# Patient Record
Sex: FEMALE | Race: WHITE | NOT HISPANIC OR LATINO | URBAN - METROPOLITAN AREA
[De-identification: names, ages, dates, MRNs, and addresses within clinical notes are randomized per-mention and may not be internally consistent; named-entity substitution may affect disease eponyms.]

---

## 2017-03-06 ENCOUNTER — APPOINTMENT (RX ONLY)
Dept: URBAN - METROPOLITAN AREA CLINIC 349 | Facility: CLINIC | Age: 77
Setting detail: DERMATOLOGY
End: 2017-03-06

## 2017-03-06 DIAGNOSIS — D485 NEOPLASM OF UNCERTAIN BEHAVIOR OF SKIN: ICD-10-CM | Status: STABLE

## 2017-03-06 DIAGNOSIS — Z85.828 PERSONAL HISTORY OF OTHER MALIGNANT NEOPLASM OF SKIN: ICD-10-CM

## 2017-03-06 DIAGNOSIS — L82.0 INFLAMED SEBORRHEIC KERATOSIS: ICD-10-CM

## 2017-03-06 DIAGNOSIS — D22 MELANOCYTIC NEVI: ICD-10-CM | Status: STABLE

## 2017-03-06 DIAGNOSIS — L82.1 OTHER SEBORRHEIC KERATOSIS: ICD-10-CM

## 2017-03-06 PROBLEM — K21.9 GASTRO-ESOPHAGEAL REFLUX DISEASE WITHOUT ESOPHAGITIS: Status: ACTIVE | Noted: 2017-03-06

## 2017-03-06 PROBLEM — D22.71 MELANOCYTIC NEVI OF RIGHT LOWER LIMB, INCLUDING HIP: Status: ACTIVE | Noted: 2017-03-06

## 2017-03-06 PROBLEM — D22.61 MELANOCYTIC NEVI OF RIGHT UPPER LIMB, INCLUDING SHOULDER: Status: ACTIVE | Noted: 2017-03-06

## 2017-03-06 PROBLEM — D22.5 MELANOCYTIC NEVI OF TRUNK: Status: ACTIVE | Noted: 2017-03-06

## 2017-03-06 PROBLEM — D22.62 MELANOCYTIC NEVI OF LEFT UPPER LIMB, INCLUDING SHOULDER: Status: ACTIVE | Noted: 2017-03-06

## 2017-03-06 PROBLEM — D04.5 CARCINOMA IN SITU OF SKIN OF TRUNK: Status: ACTIVE | Noted: 2017-03-06

## 2017-03-06 PROBLEM — D22.72 MELANOCYTIC NEVI OF LEFT LOWER LIMB, INCLUDING HIP: Status: ACTIVE | Noted: 2017-03-06

## 2017-03-06 PROBLEM — D48.5 NEOPLASM OF UNCERTAIN BEHAVIOR OF SKIN: Status: ACTIVE | Noted: 2017-03-06

## 2017-03-06 PROCEDURE — ? COUNSELING

## 2017-03-06 PROCEDURE — 11100: CPT | Mod: 59

## 2017-03-06 PROCEDURE — ? MEDICAL PHOTOGRAPHY REVIEW

## 2017-03-06 PROCEDURE — ? PATHOLOGY BILLING

## 2017-03-06 PROCEDURE — ? BIOPSY BY SHAVE METHOD

## 2017-03-06 PROCEDURE — A4550 SURGICAL TRAYS: HCPCS

## 2017-03-06 PROCEDURE — 99214 OFFICE O/P EST MOD 30 MIN: CPT | Mod: 25

## 2017-03-06 PROCEDURE — 17110 DESTRUCTION B9 LES UP TO 14: CPT

## 2017-03-06 PROCEDURE — ? LIQUID NITROGEN

## 2017-03-06 PROCEDURE — 88305 TISSUE EXAM BY PATHOLOGIST: CPT

## 2017-03-06 ASSESSMENT — LOCATION DETAILED DESCRIPTION DERM
LOCATION DETAILED: LEFT ANTERIOR LATERAL PROXIMAL UPPER ARM
LOCATION DETAILED: RIGHT POSTERIOR SHOULDER
LOCATION DETAILED: LEFT DISTAL LATERAL POSTERIOR THIGH
LOCATION DETAILED: RIGHT PROXIMAL POSTERIOR THIGH
LOCATION DETAILED: RIGHT INFERIOR MEDIAL UPPER BACK
LOCATION DETAILED: MIDDLE STERNUM
LOCATION DETAILED: LEFT DISTAL DORSAL FOREARM
LOCATION DETAILED: RIGHT MID-UPPER BACK
LOCATION DETAILED: LEFT PROXIMAL RADIAL DORSAL FOREARM
LOCATION DETAILED: RIGHT SUPERIOR MEDIAL LOWER BACK
LOCATION DETAILED: LEFT POSTERIOR SHOULDER

## 2017-03-06 ASSESSMENT — LOCATION SIMPLE DESCRIPTION DERM
LOCATION SIMPLE: LEFT SHOULDER
LOCATION SIMPLE: RIGHT UPPER BACK
LOCATION SIMPLE: LEFT UPPER ARM
LOCATION SIMPLE: CHEST
LOCATION SIMPLE: LEFT POSTERIOR THIGH
LOCATION SIMPLE: RIGHT SHOULDER
LOCATION SIMPLE: RIGHT LOWER BACK
LOCATION SIMPLE: RIGHT POSTERIOR THIGH
LOCATION SIMPLE: LEFT FOREARM

## 2017-03-06 ASSESSMENT — LOCATION ZONE DERM
LOCATION ZONE: LEG
LOCATION ZONE: ARM
LOCATION ZONE: TRUNK

## 2017-03-06 NOTE — PROCEDURE: BIOPSY BY SHAVE METHOD
Size Of Lesion In Cm: 0
Dressing: bandage
Electrodesiccation And Curettage Text: The wound bed was treated with electrodesiccation and curettage after the biopsy was performed.
Biopsy Type: H and E
Bill For Surgical Tray: yes
Detail Level: Detailed
Accession #: [id number]- Physician reading
Biopsy Method: Dermablade
Silver Nitrate Text: The wound bed was treated with silver nitrate after the biopsy was performed.
Notification Instructions: Patient will be notified of biopsy results. However, patient instructed to call the office if not contacted within 2 weeks. After the procedure, the patient was oriented to person, place, and time. Patient denied feeling dizzy, queasy, and and declined further observation after initial 5 minute observation time.
Bill 79304 For Specimen Handling/Conveyance To Laboratory?: no
Wound Care: Vaseline
Hemostasis: Aluminum Chloride
Billing Type: Third-Party Bill
Anesthesia Type: 1% lidocaine with epinephrine
Consent: Written consent was obtained and risks were reviewed including but not limited to scarring, infection, bleeding, scabbing, incomplete removal, nerve damage and allergy to anesthesia.
Electrodesiccation Text: The wound bed was treated with electrodesiccation after the biopsy was performed.
Post-Care Instructions: I reviewed with the patient in detail post-care instructions. Patient is to keep the biopsy site dry overnight, and then apply Vaseline  daily until healed. Patient may apply hydrogen peroxide soaks to remove any crusting. After the procedure, the patient was oriented to person, place, and time. Patient denied feeling dizzy, queasy, and and declined further observation after initial 5 minute observation time.
Type Of Destruction Used: Curettage
Anesthesia Volume In Cc (Will Not Render If 0): 0.5
Cryotherapy Text: The wound bed was treated with cryotherapy after the biopsy was performed.
Curettage Text: The wound bed was treated with curettage after the biopsy was performed.

## 2017-03-06 NOTE — PROCEDURE: MEDICAL PHOTOGRAPHY REVIEW
Number Of Photographs Reviewed: 6
Detail Level: Detailed
Review Findings: no new or changing lesions

## 2017-03-06 NOTE — PROCEDURE: LIQUID NITROGEN
Detail Level: Detailed
Post-Care Instructions: I reviewed with the patient in detail post-care instructions. Patient is to wear sunprotection, and avoid picking at any of the treated lesions. Pt may apply Vaseline to crusted or scabbing areas.
Include Z78.9 (Other Specified Conditions Influencing Health Status) As An Associated Diagnosis?: Yes
Medical Necessity Clause: This procedure was medically necessary because the lesions that were treated were:
Medical Necessity Information: It is in your best interest to select a reason for this procedure from the list below. All of these items fulfill various CMS LCD requirements except the new and changing color options.
Consent: The patient's consent was obtained including but not limited to risks of crusting, scabbing, blistering, scarring, darker or lighter pigmentary change, recurrence, incomplete removal and infection.

## 2017-04-06 ENCOUNTER — APPOINTMENT (RX ONLY)
Dept: URBAN - METROPOLITAN AREA CLINIC 349 | Facility: CLINIC | Age: 77
Setting detail: DERMATOLOGY
End: 2017-04-06

## 2017-04-06 PROBLEM — D04.5 CARCINOMA IN SITU OF SKIN OF TRUNK: Status: ACTIVE | Noted: 2017-04-06

## 2017-04-06 PROCEDURE — ? CURETTAGE AND DESTRUCTION

## 2017-04-06 PROCEDURE — 17263 DSTRJ MAL LES T/A/L 2.1-3.0: CPT

## 2017-04-06 PROCEDURE — A4550 SURGICAL TRAYS: HCPCS

## 2017-04-06 PROCEDURE — ? COUNSELING

## 2017-04-06 NOTE — PROCEDURE: CURETTAGE AND DESTRUCTION
Bill As A Line Item Or As Units: Line Item
Render Post-Care Instructions In Note?: no
Anesthesia Volume In Cc: 0
Additional Information: (Optional): The wound was cleaned, and a pressure dressing was applied.  The patient received detailed post-op instructions.
Number Of Curettages: 3
What Was Performed First?: Curettage
Cautery Type: electrodesiccation
Anesthesia Type: 1% lidocaine with epinephrine
Post-Care Instructions: I reviewed with the patient in detail post-care instructions. Patient is to keep the area dry for 48 hours, and not to engage in any swimming until the area is healed. Should the patient develop any fevers, chills, bleeding, severe pain patient will contact the office immediately.
Bill For Surgical Tray: yes
Size Of Lesion In Cm: 2.5
Detail Level: Detailed
Consent was obtained from the patient. The risks, benefits and alternatives to therapy were discussed in detail. Specifically, the risks of infection, scarring, bleeding, prolonged wound healing, nerve injury, incomplete removal, allergy to anesthesia and recurrence were addressed. Alternatives to ED&C, such as: surgical removal and XRT were also discussed.  Prior to the procedure, the treatment site was clearly identified and confirmed by the patient. All components of Universal Protocol/PAUSE Rule completed.

## 2017-08-22 ENCOUNTER — APPOINTMENT (RX ONLY)
Dept: URBAN - METROPOLITAN AREA CLINIC 349 | Facility: CLINIC | Age: 77
Setting detail: DERMATOLOGY
End: 2017-08-22

## 2017-08-22 DIAGNOSIS — L30.9 DERMATITIS, UNSPECIFIED: ICD-10-CM

## 2017-08-22 DIAGNOSIS — L57.0 ACTINIC KERATOSIS: ICD-10-CM

## 2017-08-22 DIAGNOSIS — Z85.828 PERSONAL HISTORY OF OTHER MALIGNANT NEOPLASM OF SKIN: ICD-10-CM

## 2017-08-22 DIAGNOSIS — D485 NEOPLASM OF UNCERTAIN BEHAVIOR OF SKIN: ICD-10-CM

## 2017-08-22 DIAGNOSIS — D22 MELANOCYTIC NEVI: ICD-10-CM | Status: STABLE

## 2017-08-22 PROBLEM — D22.72 MELANOCYTIC NEVI OF LEFT LOWER LIMB, INCLUDING HIP: Status: ACTIVE | Noted: 2017-08-22

## 2017-08-22 PROBLEM — D22.71 MELANOCYTIC NEVI OF RIGHT LOWER LIMB, INCLUDING HIP: Status: ACTIVE | Noted: 2017-08-22

## 2017-08-22 PROBLEM — D48.5 NEOPLASM OF UNCERTAIN BEHAVIOR OF SKIN: Status: ACTIVE | Noted: 2017-08-22

## 2017-08-22 PROBLEM — D22.61 MELANOCYTIC NEVI OF RIGHT UPPER LIMB, INCLUDING SHOULDER: Status: ACTIVE | Noted: 2017-08-22

## 2017-08-22 PROBLEM — D22.5 MELANOCYTIC NEVI OF TRUNK: Status: ACTIVE | Noted: 2017-08-22

## 2017-08-22 PROBLEM — D22.62 MELANOCYTIC NEVI OF LEFT UPPER LIMB, INCLUDING SHOULDER: Status: ACTIVE | Noted: 2017-08-22

## 2017-08-22 PROBLEM — K21.9 GASTRO-ESOPHAGEAL REFLUX DISEASE WITHOUT ESOPHAGITIS: Status: ACTIVE | Noted: 2017-08-22

## 2017-08-22 PROCEDURE — ? LIQUID NITROGEN

## 2017-08-22 PROCEDURE — ? OBSERVATION

## 2017-08-22 PROCEDURE — ? MEDICAL PHOTOGRAPHY REVIEW

## 2017-08-22 PROCEDURE — 99214 OFFICE O/P EST MOD 30 MIN: CPT | Mod: 25

## 2017-08-22 PROCEDURE — 17000 DESTRUCT PREMALG LESION: CPT

## 2017-08-22 PROCEDURE — ? OTHER

## 2017-08-22 PROCEDURE — ? COUNSELING

## 2017-08-22 ASSESSMENT — LOCATION DETAILED DESCRIPTION DERM
LOCATION DETAILED: RIGHT PROXIMAL DORSAL FOREARM
LOCATION DETAILED: RIGHT SUPERIOR MEDIAL LOWER BACK
LOCATION DETAILED: LEFT DISTAL DORSAL FOREARM
LOCATION DETAILED: RIGHT POSTERIOR SHOULDER
LOCATION DETAILED: RIGHT SUPERIOR UPPER BACK
LOCATION DETAILED: LEFT RADIAL DORSAL HAND
LOCATION DETAILED: RIGHT INFERIOR MEDIAL UPPER BACK
LOCATION DETAILED: LEFT DISTAL CALF
LOCATION DETAILED: RIGHT PROXIMAL POSTERIOR THIGH
LOCATION DETAILED: LEFT DISTAL LATERAL POSTERIOR THIGH
LOCATION DETAILED: LEFT PROXIMAL DORSAL FOREARM
LOCATION DETAILED: LEFT POSTERIOR SHOULDER

## 2017-08-22 ASSESSMENT — LOCATION SIMPLE DESCRIPTION DERM
LOCATION SIMPLE: LEFT CALF
LOCATION SIMPLE: LEFT SHOULDER
LOCATION SIMPLE: RIGHT FOREARM
LOCATION SIMPLE: LEFT POSTERIOR THIGH
LOCATION SIMPLE: RIGHT LOWER BACK
LOCATION SIMPLE: RIGHT POSTERIOR THIGH
LOCATION SIMPLE: LEFT HAND
LOCATION SIMPLE: LEFT FOREARM
LOCATION SIMPLE: RIGHT SHOULDER
LOCATION SIMPLE: RIGHT UPPER BACK

## 2017-08-22 ASSESSMENT — LOCATION ZONE DERM
LOCATION ZONE: ARM
LOCATION ZONE: HAND
LOCATION ZONE: TRUNK
LOCATION ZONE: LEG

## 2017-08-22 ASSESSMENT — PAIN INTENSITY VAS: HOW INTENSE IS YOUR PAIN 0 BEING NO PAIN, 10 BEING THE MOST SEVERE PAIN POSSIBLE?: NO PAIN

## 2017-08-22 NOTE — PROCEDURE: OTHER
Other (Free Text): Pt thinks irritation is due to chemo\\nWill recheck in 2-3mo\\nSEE PHOTO
Note Text (......Xxx Chief Complaint.): This diagnosis correlates with the
Detail Level: Simple

## 2017-08-22 NOTE — PROCEDURE: LIQUID NITROGEN
Consent: The patient's consent was obtained including but not limited to risks of crusting, scabbing, blistering, scarring, darker or lighter pigmentary change, recurrence, incomplete removal and infection.
Number Of Freeze-Thaw Cycles: 2 freeze-thaw cycles
Duration Of Freeze Thaw-Cycle (Seconds): 3
Post-Care Instructions: I reviewed with the patient in detail post-care instructions. Patient is to wear sunprotection, and avoid picking at any of the treated lesions. Pt may apply Vaseline to crusted or scabbing areas.
Render Post-Care Instructions In Note?: no
Detail Level: Detailed

## 2017-08-22 NOTE — PROCEDURE: MEDICAL PHOTOGRAPHY REVIEW
Review Findings: no new or changing lesions
Number Of Photographs Reviewed: 6
Detail Level: Detailed

## 2017-12-11 ENCOUNTER — APPOINTMENT (RX ONLY)
Dept: URBAN - METROPOLITAN AREA CLINIC 349 | Facility: CLINIC | Age: 77
Setting detail: DERMATOLOGY
End: 2017-12-11

## 2017-12-11 DIAGNOSIS — L30.9 DERMATITIS, UNSPECIFIED: ICD-10-CM

## 2017-12-11 DIAGNOSIS — D485 NEOPLASM OF UNCERTAIN BEHAVIOR OF SKIN: ICD-10-CM | Status: RESOLVED

## 2017-12-11 DIAGNOSIS — L57.0 ACTINIC KERATOSIS: ICD-10-CM

## 2017-12-11 PROBLEM — D48.5 NEOPLASM OF UNCERTAIN BEHAVIOR OF SKIN: Status: ACTIVE | Noted: 2017-12-11

## 2017-12-11 PROCEDURE — ? BIOPSY BY SHAVE METHOD

## 2017-12-11 PROCEDURE — 11100: CPT

## 2017-12-11 PROCEDURE — A4550 SURGICAL TRAYS: HCPCS

## 2017-12-11 PROCEDURE — ? COUNSELING

## 2017-12-11 PROCEDURE — ? TREATMENT REGIMEN

## 2017-12-11 PROCEDURE — ? PRESCRIPTION

## 2017-12-11 PROCEDURE — 99213 OFFICE O/P EST LOW 20 MIN: CPT | Mod: 25

## 2017-12-11 RX ORDER — TRIAMCINOLONE ACETONIDE 1 MG/G
CREAM TOPICAL BID
Qty: 1 | Refills: 3 | Status: ERX | COMMUNITY
Start: 2017-12-11

## 2017-12-11 RX ADMIN — TRIAMCINOLONE ACETONIDE: 1 CREAM TOPICAL at 20:22

## 2017-12-11 ASSESSMENT — LOCATION SIMPLE DESCRIPTION DERM
LOCATION SIMPLE: LEFT FOREARM
LOCATION SIMPLE: LEFT PRETIBIAL REGION

## 2017-12-11 ASSESSMENT — LOCATION ZONE DERM
LOCATION ZONE: ARM
LOCATION ZONE: LEG

## 2017-12-11 ASSESSMENT — LOCATION DETAILED DESCRIPTION DERM
LOCATION DETAILED: LEFT DISTAL PRETIBIAL REGION
LOCATION DETAILED: LEFT PROXIMAL RADIAL DORSAL FOREARM

## 2017-12-11 NOTE — PROCEDURE: BIOPSY BY SHAVE METHOD
Hemostasis: Aluminum Chloride
Billing Type: Third-Party Bill
Curettage Text: The wound bed was treated with curettage after the biopsy was performed.
Anesthesia Type: 2% lidocaine with epinephrine and a 1:10 solution of 8.4% sodium bicarbonate
Post-Care Instructions: I reviewed with the patient in detail post-care instructions. Patient is to keep the biopsy site dry overnight, and then apply Vaseline  daily until healed. Patient may apply hydrogen peroxide soaks to remove any crusting. After the procedure, the patient was oriented to person, place, and time. Patient denied feeling dizzy, queasy, and and declined further observation after initial 5 minute observation time.
Anesthesia Volume In Cc (Will Not Render If 0): 0.5
Cryotherapy Text: The wound bed was treated with cryotherapy after the biopsy was performed.
Render Post-Care Instructions In Note?: yes
Consent: Written consent was obtained and risks were reviewed including but not limited to scarring, infection, bleeding, scabbing, incomplete removal, nerve damage and allergy to anesthesia.
Type Of Destruction Used: Curettage
Additional Anesthesia Volume In Cc (Will Not Render If 0): 1.5
X Size Of Lesion In Cm: 0
Silver Nitrate Text: The wound bed was treated with silver nitrate after the biopsy was performed.
Destruction After The Procedure: No
Biopsy Method: Dermablade
Accession #: global
Biopsy Type: H and E
Electrodesiccation Text: The wound bed was treated with electrodesiccation after the biopsy was performed.
Electrodesiccation And Curettage Text: The wound bed was treated with electrodesiccation and curettage after the biopsy was performed.
Detail Level: Detailed
Wound Care: Vaseline
Dressing: bandage
Notification Instructions: Patient will be notified of biopsy results. However, patient instructed to call the office if not contacted within 2 weeks. After the procedure, the patient was oriented to person, place, and time. Patient denied feeling dizzy, queasy, and and declined further observation after initial 5 minute observation time.

## 2018-01-24 PROBLEM — G89.4 CHRONIC PAIN SYNDROME: Status: ACTIVE | Noted: 2018-01-24

## 2018-06-26 ENCOUNTER — APPOINTMENT (RX ONLY)
Dept: URBAN - METROPOLITAN AREA CLINIC 349 | Facility: CLINIC | Age: 78
Setting detail: DERMATOLOGY
End: 2018-06-26

## 2018-06-26 DIAGNOSIS — L82.0 INFLAMED SEBORRHEIC KERATOSIS: ICD-10-CM

## 2018-06-26 DIAGNOSIS — D22 MELANOCYTIC NEVI: ICD-10-CM | Status: STABLE

## 2018-06-26 DIAGNOSIS — Z85.828 PERSONAL HISTORY OF OTHER MALIGNANT NEOPLASM OF SKIN: ICD-10-CM

## 2018-06-26 DIAGNOSIS — D485 NEOPLASM OF UNCERTAIN BEHAVIOR OF SKIN: ICD-10-CM

## 2018-06-26 PROBLEM — D22.5 MELANOCYTIC NEVI OF TRUNK: Status: ACTIVE | Noted: 2018-06-26

## 2018-06-26 PROBLEM — D22.61 MELANOCYTIC NEVI OF RIGHT UPPER LIMB, INCLUDING SHOULDER: Status: ACTIVE | Noted: 2018-06-26

## 2018-06-26 PROBLEM — D22.62 MELANOCYTIC NEVI OF LEFT UPPER LIMB, INCLUDING SHOULDER: Status: ACTIVE | Noted: 2018-06-26

## 2018-06-26 PROBLEM — D48.5 NEOPLASM OF UNCERTAIN BEHAVIOR OF SKIN: Status: ACTIVE | Noted: 2018-06-26

## 2018-06-26 PROBLEM — D04.62 CARCINOMA IN SITU OF SKIN OF LEFT UPPER LIMB, INCLUDING SHOULDER: Status: ACTIVE | Noted: 2018-06-26

## 2018-06-26 PROBLEM — M12.9 ARTHROPATHY, UNSPECIFIED: Status: ACTIVE | Noted: 2018-06-26

## 2018-06-26 PROBLEM — D22.72 MELANOCYTIC NEVI OF LEFT LOWER LIMB, INCLUDING HIP: Status: ACTIVE | Noted: 2018-06-26

## 2018-06-26 PROBLEM — D22.71 MELANOCYTIC NEVI OF RIGHT LOWER LIMB, INCLUDING HIP: Status: ACTIVE | Noted: 2018-06-26

## 2018-06-26 PROCEDURE — ? COUNSELING

## 2018-06-26 PROCEDURE — 99214 OFFICE O/P EST MOD 30 MIN: CPT | Mod: 25

## 2018-06-26 PROCEDURE — 11100: CPT

## 2018-06-26 PROCEDURE — 88305 TISSUE EXAM BY PATHOLOGIST: CPT

## 2018-06-26 PROCEDURE — ? MEDICAL PHOTOGRAPHY REVIEW

## 2018-06-26 PROCEDURE — ? BIOPSY BY SHAVE METHOD

## 2018-06-26 PROCEDURE — A4550 SURGICAL TRAYS: HCPCS

## 2018-06-26 PROCEDURE — ? PATHOLOGY BILLING

## 2018-06-26 PROCEDURE — ? OBSERVATION

## 2018-06-26 ASSESSMENT — LOCATION SIMPLE DESCRIPTION DERM
LOCATION SIMPLE: RIGHT POSTERIOR THIGH
LOCATION SIMPLE: LEFT SHOULDER
LOCATION SIMPLE: RIGHT UPPER BACK
LOCATION SIMPLE: LEFT POSTERIOR THIGH
LOCATION SIMPLE: RIGHT SHOULDER
LOCATION SIMPLE: LEFT PRETIBIAL REGION
LOCATION SIMPLE: LEFT FOREARM
LOCATION SIMPLE: RIGHT LOWER BACK

## 2018-06-26 ASSESSMENT — LOCATION ZONE DERM
LOCATION ZONE: LEG
LOCATION ZONE: TRUNK
LOCATION ZONE: ARM

## 2018-06-26 ASSESSMENT — LOCATION DETAILED DESCRIPTION DERM
LOCATION DETAILED: RIGHT PROXIMAL POSTERIOR THIGH
LOCATION DETAILED: RIGHT INFERIOR MEDIAL UPPER BACK
LOCATION DETAILED: RIGHT POSTERIOR SHOULDER
LOCATION DETAILED: RIGHT SUPERIOR MEDIAL LOWER BACK
LOCATION DETAILED: LEFT ANTERIOR SHOULDER
LOCATION DETAILED: LEFT DISTAL PRETIBIAL REGION
LOCATION DETAILED: LEFT POSTERIOR SHOULDER
LOCATION DETAILED: LEFT DISTAL LATERAL POSTERIOR THIGH
LOCATION DETAILED: LEFT DISTAL DORSAL FOREARM
LOCATION DETAILED: RIGHT SUPERIOR UPPER BACK

## 2018-06-26 ASSESSMENT — PAIN INTENSITY VAS: HOW INTENSE IS YOUR PAIN 0 BEING NO PAIN, 10 BEING THE MOST SEVERE PAIN POSSIBLE?: NO PAIN

## 2018-06-26 NOTE — PROCEDURE: PATHOLOGY BILLING
Immunohistochemistry ([id number] and [id number]) billing is not performed here. Please use the Immunohistochemistry Stain Billing plan to accomplish this. Immunohistochemistry ([id number] and [id number]) billing is not performed here. Please use the Immunohistochemistry Stain Billing plan to accomplish this.

## 2018-06-26 NOTE — PROCEDURE: BIOPSY BY SHAVE METHOD
Billing Type: Third-Party Bill
Depth Of Biopsy: dermis
Detail Level: Detailed
Destruction After The Procedure: No
Electrodesiccation Text: The wound bed was treated with electrodesiccation after the biopsy was performed.
Render Post-Care Instructions In Note?: yes
Curettage Text: The wound bed was treated with curettage after the biopsy was performed.
Wound Care: Vaseline
Consent: Written consent was obtained and risks were reviewed including but not limited to scarring, infection, bleeding, scabbing, incomplete removal, nerve damage and allergy to anesthesia.
Hemostasis: Aluminum Chloride
Size Of Lesion In Cm: 0
Cryotherapy Text: The wound bed was treated with cryotherapy after the biopsy was performed.
Anesthesia Type: 1% lidocaine with 1:100,000 epinephrine and a 1:10 solution of 8.4% sodium bicarbonate
Biopsy Type: H and E
Anesthesia Volume In Cc (Will Not Render If 0): 1
Notification Instructions: Patient will be notified of biopsy results. However, patient instructed to call the office if not contacted within 2 weeks. After the procedure, the patient was oriented to person, place, and time. Patient denied feeling dizzy, queasy, and and declined further observation after initial 5 minute observation time.
Silver Nitrate Text: The wound bed was treated with silver nitrate after the biopsy was performed.
Accession #: [id number] ONLY
Type Of Destruction Used: Curettage
Biopsy Method: Personna blade
Post-Care Instructions: I reviewed with the patient in detail post-care instructions. Patient is to keep the biopsy site dry overnight, and then apply Vaseline  daily until healed. Patient may apply hydrogen peroxide soaks to remove any crusting. After the procedure, the patient was oriented to person, place, and time. Patient denied feeling dizzy, queasy, and and declined further observation after initial 5 minute observation time.
Dressing: bandage
Electrodesiccation And Curettage Text: The wound bed was treated with electrodesiccation and curettage after the biopsy was performed.
Additional Anesthesia Volume In Cc (Will Not Render If 0): 1.5

## 2018-08-15 ENCOUNTER — APPOINTMENT (RX ONLY)
Dept: URBAN - METROPOLITAN AREA CLINIC 349 | Facility: CLINIC | Age: 78
Setting detail: DERMATOLOGY
End: 2018-08-15

## 2018-08-15 DIAGNOSIS — D485 NEOPLASM OF UNCERTAIN BEHAVIOR OF SKIN: ICD-10-CM

## 2018-08-15 PROBLEM — D48.5 NEOPLASM OF UNCERTAIN BEHAVIOR OF SKIN: Status: ACTIVE | Noted: 2018-08-15

## 2018-08-15 PROBLEM — D04.62 CARCINOMA IN SITU OF SKIN OF LEFT UPPER LIMB, INCLUDING SHOULDER: Status: ACTIVE | Noted: 2018-08-15

## 2018-08-15 PROCEDURE — A4550 SURGICAL TRAYS: HCPCS

## 2018-08-15 PROCEDURE — ? OBSERVATION

## 2018-08-15 PROCEDURE — ? COUNSELING

## 2018-08-15 PROCEDURE — ? CURETTAGE AND DESTRUCTION

## 2018-08-15 PROCEDURE — 17262 DSTRJ MAL LES T/A/L 1.1-2.0: CPT

## 2018-08-15 PROCEDURE — 99213 OFFICE O/P EST LOW 20 MIN: CPT | Mod: 25

## 2018-08-15 ASSESSMENT — LOCATION SIMPLE DESCRIPTION DERM: LOCATION SIMPLE: RIGHT FOREARM

## 2018-08-15 ASSESSMENT — LOCATION ZONE DERM: LOCATION ZONE: ARM

## 2018-08-15 ASSESSMENT — LOCATION DETAILED DESCRIPTION DERM: LOCATION DETAILED: RIGHT PROXIMAL DORSAL FOREARM

## 2018-08-15 NOTE — PROCEDURE: CURETTAGE AND DESTRUCTION
Consent was obtained from the patient. The risks, benefits and alternatives to therapy were discussed in detail. Specifically, the risks of infection, scarring, bleeding, prolonged wound healing, nerve injury, incomplete removal, allergy to anesthesia and recurrence were addressed. Alternatives to ED&C, such as: surgical removal and XRT were also discussed.  Prior to the procedure, the treatment site was clearly identified and confirmed by the patient. All components of Universal Protocol/PAUSE Rule completed.
What Was Performed First?: Curettage
Number Of Curettages: 3
Size Of Lesion After Curettage: 1.2
Post-Care Instructions: I reviewed with the patient in detail post-care instructions. Patient is to keep the area dry for 48 hours, and not to engage in any swimming until the area is healed. Should the patient develop any fevers, chills, bleeding, severe pain patient will contact the office immediately.
Bill As A Line Item Or As Units: Line Item
Anesthesia Volume In Cc: 1.5
Cautery Type: electrodesiccation
Render Post-Care Instructions In Note?: no
Bill For Surgical Tray: yes
Anesthesia Type: 1% lidocaine with epinephrine
Total Volume (Ccs): 1
Detail Level: Detailed
Additional Information: (Optional): The wound was cleaned, and a pressure dressing was applied.  The patient received detailed post-op instructions.

## 2018-10-04 ENCOUNTER — APPOINTMENT (RX ONLY)
Dept: URBAN - METROPOLITAN AREA CLINIC 349 | Facility: CLINIC | Age: 78
Setting detail: DERMATOLOGY
End: 2018-10-04

## 2018-10-04 DIAGNOSIS — L90.5 SCAR CONDITIONS AND FIBROSIS OF SKIN: ICD-10-CM

## 2018-10-04 DIAGNOSIS — L82.0 INFLAMED SEBORRHEIC KERATOSIS: ICD-10-CM

## 2018-10-04 PROBLEM — E03.9 HYPOTHYROIDISM, UNSPECIFIED: Status: ACTIVE | Noted: 2018-10-04

## 2018-10-04 PROBLEM — F41.9 ANXIETY DISORDER, UNSPECIFIED: Status: ACTIVE | Noted: 2018-10-04

## 2018-10-04 PROBLEM — D04.61 CARCINOMA IN SITU OF SKIN OF RIGHT UPPER LIMB, INCLUDING SHOULDER: Status: ACTIVE | Noted: 2018-10-04

## 2018-10-04 PROCEDURE — 11100: CPT

## 2018-10-04 PROCEDURE — A4550 SURGICAL TRAYS: HCPCS

## 2018-10-04 PROCEDURE — 99213 OFFICE O/P EST LOW 20 MIN: CPT | Mod: 25

## 2018-10-04 PROCEDURE — ? BIOPSY BY SHAVE METHOD

## 2018-10-04 PROCEDURE — 88305 TISSUE EXAM BY PATHOLOGIST: CPT

## 2018-10-04 PROCEDURE — ? COUNSELING

## 2018-10-04 PROCEDURE — ? PATHOLOGY BILLING

## 2018-10-04 ASSESSMENT — LOCATION ZONE DERM
LOCATION ZONE: LEG
LOCATION ZONE: TRUNK

## 2018-10-04 ASSESSMENT — LOCATION SIMPLE DESCRIPTION DERM
LOCATION SIMPLE: UPPER BACK
LOCATION SIMPLE: LEFT PRETIBIAL REGION

## 2018-10-04 ASSESSMENT — LOCATION DETAILED DESCRIPTION DERM
LOCATION DETAILED: INFERIOR THORACIC SPINE
LOCATION DETAILED: LEFT DISTAL PRETIBIAL REGION

## 2018-10-04 NOTE — PROCEDURE: PATHOLOGY BILLING
Immunohistochemistry ([id number] and [id number]) billing is not performed here. Please use the Immunohistochemistry Stain Billing plan to accomplish this. Immunohistochemistry ([id number] and [id number]) billing is not performed here. Please use the Immunohistochemistry Stain Billing plan to accomplish this.

## 2018-10-04 NOTE — PROCEDURE: BIOPSY BY SHAVE METHOD
Size Of Lesion In Cm: 0
Dressing: bandage
Electrodesiccation And Curettage Text: The wound bed was treated with electrodesiccation and curettage after the biopsy was performed.
Detail Level: Detailed
Accession #: [id number]
Silver Nitrate Text: The wound bed was treated with silver nitrate after the biopsy was performed.
Hemostasis: Aluminum Chloride
Consent: Written consent was obtained and risks were reviewed including but not limited to scarring, infection, bleeding, scabbing, incomplete removal, nerve damage and allergy to anesthesia.
Anesthesia Volume In Cc (Will Not Render If 0): 1
Biopsy Type: H and E
Post-Care Instructions: I reviewed with the patient in detail post-care instructions. Patient is to keep the biopsy site dry overnight, and then apply Vaseline  daily until healed. Patient may apply hydrogen peroxide soaks to remove any crusting. After the procedure, the patient was oriented to person, place, and time. Patient denied feeling dizzy, queasy, and and declined further observation after initial 5 minute observation time.
Cryotherapy Text: The wound bed was treated with cryotherapy after the biopsy was performed.
Additional Anesthesia Volume In Cc (Will Not Render If 0): 1.5
Bill For Surgical Tray: yes
Wound Care: Vaseline
Notification Instructions: Patient will be notified of biopsy results. However, patient instructed to call the office if not contacted within 2 weeks. After the procedure, the patient was oriented to person, place, and time. Patient denied feeling dizzy, queasy, and and declined further observation after initial 5 minute observation time.
Anesthesia Type: 1% lidocaine with 1:100,000 epinephrine and a 1:10 solution of 8.4% sodium bicarbonate
Destruction After The Procedure: No
Depth Of Biopsy: dermis
Type Of Destruction Used: Curettage
Biopsy Method: Personna blade
Curettage Text: The wound bed was treated with curettage after the biopsy was performed.
Electrodesiccation Text: The wound bed was treated with electrodesiccation after the biopsy was performed.
Billing Type: Third-Party Bill

## 2018-10-25 ENCOUNTER — APPOINTMENT (RX ONLY)
Dept: URBAN - METROPOLITAN AREA CLINIC 349 | Facility: CLINIC | Age: 78
Setting detail: DERMATOLOGY
End: 2018-10-25

## 2018-10-25 DIAGNOSIS — L30.4 ERYTHEMA INTERTRIGO: ICD-10-CM

## 2018-10-25 PROBLEM — D04.61 CARCINOMA IN SITU OF SKIN OF RIGHT UPPER LIMB, INCLUDING SHOULDER: Status: ACTIVE | Noted: 2018-10-25

## 2018-10-25 PROCEDURE — ? COUNSELING

## 2018-10-25 PROCEDURE — A4550 SURGICAL TRAYS: HCPCS

## 2018-10-25 PROCEDURE — ? PRESCRIPTION

## 2018-10-25 PROCEDURE — 17263 DSTRJ MAL LES T/A/L 2.1-3.0: CPT

## 2018-10-25 PROCEDURE — 99212 OFFICE O/P EST SF 10 MIN: CPT | Mod: 25

## 2018-10-25 PROCEDURE — ? CURETTAGE AND DESTRUCTION

## 2018-10-25 RX ORDER — IODOQUINOL, HYDROCORTISONE ACETATE AND ALOE VERA LEAF 10; 20; 10 MG/G; MG/G; MG/G
GEL TOPICAL
Qty: 1 | Refills: 3 | Status: ERX | COMMUNITY
Start: 2018-10-25

## 2018-10-25 RX ADMIN — IODOQUINOL, HYDROCORTISONE ACETATE AND ALOE VERA LEAF: 10; 20; 10 GEL TOPICAL at 17:31

## 2018-10-25 ASSESSMENT — LOCATION DETAILED DESCRIPTION DERM
LOCATION DETAILED: LEFT LATERAL ABDOMEN
LOCATION DETAILED: RIGHT LATERAL ABDOMEN

## 2018-10-25 ASSESSMENT — LOCATION ZONE DERM: LOCATION ZONE: TRUNK

## 2018-10-25 ASSESSMENT — LOCATION SIMPLE DESCRIPTION DERM: LOCATION SIMPLE: ABDOMEN

## 2018-10-25 NOTE — PROCEDURE: CURETTAGE AND DESTRUCTION
Bill For Surgical Tray: yes
What Was Performed First?: Curettage
Anesthesia Volume In Cc: 3
Size Of Lesion In Cm: 2.8
Additional Information: (Optional): The wound was cleaned, and a pressure dressing was applied.  The patient received detailed post-op instructions.
Post-Care Instructions: I reviewed with the patient in detail post-care instructions. Patient is to keep the area dry for 48 hours, and not to engage in any swimming until the area is healed. Should the patient develop any fevers, chills, bleeding, severe pain patient will contact the office immediately.
Total Volume (Ccs): 1
Bill As A Line Item Or As Units: Line Item
Cautery Type: electrodesiccation
Consent was obtained from the patient. The risks, benefits and alternatives to therapy were discussed in detail. Specifically, the risks of infection, scarring, bleeding, prolonged wound healing, nerve injury, incomplete removal, allergy to anesthesia and recurrence were addressed. Alternatives to ED&C, such as: surgical removal and XRT were also discussed.  Prior to the procedure, the treatment site was clearly identified and confirmed by the patient. All components of Universal Protocol/PAUSE Rule completed.
Add Intralesional Injection: No
Detail Level: Detailed
Anesthesia Type: 1% lidocaine with epinephrine and a 1:10 solution of 8.4% sodium bicarbonate

## 2019-04-04 ENCOUNTER — APPOINTMENT (RX ONLY)
Dept: URBAN - METROPOLITAN AREA CLINIC 349 | Facility: CLINIC | Age: 79
Setting detail: DERMATOLOGY
End: 2019-04-04

## 2019-04-04 DIAGNOSIS — L30.4 ERYTHEMA INTERTRIGO: ICD-10-CM | Status: WELL CONTROLLED

## 2019-04-04 DIAGNOSIS — Z71.89 OTHER SPECIFIED COUNSELING: ICD-10-CM

## 2019-04-04 DIAGNOSIS — L30.9 DERMATITIS, UNSPECIFIED: ICD-10-CM | Status: WELL CONTROLLED

## 2019-04-04 DIAGNOSIS — D22 MELANOCYTIC NEVI: ICD-10-CM

## 2019-04-04 DIAGNOSIS — Z85.828 PERSONAL HISTORY OF OTHER MALIGNANT NEOPLASM OF SKIN: ICD-10-CM

## 2019-04-04 PROBLEM — D48.5 NEOPLASM OF UNCERTAIN BEHAVIOR OF SKIN: Status: ACTIVE | Noted: 2019-04-04

## 2019-04-04 PROBLEM — D22.5 MELANOCYTIC NEVI OF TRUNK: Status: ACTIVE | Noted: 2019-04-04

## 2019-04-04 PROBLEM — M12.9 ARTHROPATHY, UNSPECIFIED: Status: ACTIVE | Noted: 2019-04-04

## 2019-04-04 PROBLEM — D04.62 CARCINOMA IN SITU OF SKIN OF LEFT UPPER LIMB, INCLUDING SHOULDER: Status: ACTIVE | Noted: 2019-04-04

## 2019-04-04 PROBLEM — D22.72 MELANOCYTIC NEVI OF LEFT LOWER LIMB, INCLUDING HIP: Status: ACTIVE | Noted: 2019-04-04

## 2019-04-04 PROCEDURE — 88305 TISSUE EXAM BY PATHOLOGIST: CPT

## 2019-04-04 PROCEDURE — ? MEDICAL PHOTOGRAPHY REVIEW

## 2019-04-04 PROCEDURE — ? OBSERVATION

## 2019-04-04 PROCEDURE — ? BODY PHOTOGRAPHY

## 2019-04-04 PROCEDURE — ? BIOPSY BY SHAVE METHOD

## 2019-04-04 PROCEDURE — 11102 TANGNTL BX SKIN SINGLE LES: CPT

## 2019-04-04 PROCEDURE — ? PATHOLOGY BILLING

## 2019-04-04 PROCEDURE — ? TREATMENT REGIMEN

## 2019-04-04 PROCEDURE — ? COUNSELING

## 2019-04-04 PROCEDURE — ? PRESCRIPTION

## 2019-04-04 PROCEDURE — 99214 OFFICE O/P EST MOD 30 MIN: CPT | Mod: 25

## 2019-04-04 PROCEDURE — A4550 SURGICAL TRAYS: HCPCS

## 2019-04-04 PROCEDURE — ? EDUCATIONAL RESOURCES PROVIDED

## 2019-04-04 RX ORDER — IODOQUINOL, HYDROCORTISONE ACETATE AND ALOE VERA LEAF 10; 20; 10 MG/G; MG/G; MG/G
GEL TOPICAL
Qty: 1 | Refills: 3 | Status: ERX

## 2019-04-04 ASSESSMENT — LOCATION ZONE DERM
LOCATION ZONE: TRUNK
LOCATION ZONE: LEG
LOCATION ZONE: FEET
LOCATION ZONE: ARM

## 2019-04-04 ASSESSMENT — LOCATION DETAILED DESCRIPTION DERM
LOCATION DETAILED: LEFT LATERAL DORSAL FOOT
LOCATION DETAILED: LEFT PROXIMAL DORSAL FOREARM
LOCATION DETAILED: RIGHT PROXIMAL DORSAL FOREARM
LOCATION DETAILED: RIGHT INFERIOR UPPER BACK
LOCATION DETAILED: RIGHT INFERIOR MEDIAL UPPER BACK
LOCATION DETAILED: RIGHT MID-UPPER BACK
LOCATION DETAILED: RIGHT SUPERIOR MEDIAL LOWER BACK
LOCATION DETAILED: LEFT DISTAL PRETIBIAL REGION
LOCATION DETAILED: RIGHT SUPERIOR UPPER BACK
LOCATION DETAILED: RIGHT LATERAL ABDOMEN
LOCATION DETAILED: LEFT DISTAL DORSAL FOREARM
LOCATION DETAILED: LEFT LATERAL ABDOMEN

## 2019-04-04 ASSESSMENT — SEVERITY ASSESSMENT: SEVERITY: CLEAR

## 2019-04-04 ASSESSMENT — LOCATION SIMPLE DESCRIPTION DERM
LOCATION SIMPLE: ABDOMEN
LOCATION SIMPLE: RIGHT UPPER BACK
LOCATION SIMPLE: LEFT PRETIBIAL REGION
LOCATION SIMPLE: RIGHT LOWER BACK
LOCATION SIMPLE: RIGHT FOREARM
LOCATION SIMPLE: LEFT FOREARM
LOCATION SIMPLE: LEFT FOOT

## 2019-04-04 NOTE — PROCEDURE: BIOPSY BY SHAVE METHOD
Hemostasis: Aluminum Chloride
Detail Level: Detailed
Biopsy Type: H and E
Bill 44538 For Specimen Handling/Conveyance To Laboratory?: no
Electrodesiccation Text: The wound bed was treated with electrodesiccation after the biopsy was performed.
Consent: Written consent was obtained and risks were reviewed including but not limited to scarring, infection, bleeding, scabbing, incomplete removal, nerve damage and allergy to anesthesia.
Anesthesia Type: 1% lidocaine with 1:100,000 epinephrine and a 1:10 solution of 8.4% sodium bicarbonate
Wound Care: Vaseline
Post-Care Instructions: I reviewed with the patient in detail post-care instructions. Patient is to keep the biopsy site dry overnight, and then apply Vaseline  daily until healed. Patient may apply hydrogen peroxide soaks to remove any crusting. After the procedure, the patient was oriented to person, place, and time. Patient denied feeling dizzy, queasy, and and declined further observation after initial 5 minute observation time.
Curettage Text: The wound bed was treated with curettage after the biopsy was performed.
Biopsy Method: Personna blade
Billing Type: Third-Party Bill
Render Post-Care Instructions In Note?: yes
Notification Instructions: Patient will be notified of biopsy results. However, patient instructed to call the office if not contacted within 2 weeks. After the procedure, the patient was oriented to person, place, and time. Patient denied feeling dizzy, queasy, and and declined further observation after initial 5 minute observation time.
Anesthesia Volume In Cc (Will Not Render If 0): 1
Depth Of Biopsy: dermis
Cryotherapy Text: The wound bed was treated with cryotherapy after the biopsy was performed.
Type Of Destruction Used: Curettage
Silver Nitrate Text: The wound bed was treated with silver nitrate after the biopsy was performed.
Dressing: bandage
Accession #: [id number] ONLY
Size Of Lesion In Cm: 0
Additional Anesthesia Volume In Cc (Will Not Render If 0): 1.5
Electrodesiccation And Curettage Text: The wound bed was treated with electrodesiccation and curettage after the biopsy was performed.

## 2019-04-04 NOTE — PROCEDURE: TREATMENT REGIMEN
Continue Regimen: triamcinolone acetonide 0.1 % topical cream BID Sig: Apply twice daily to rash up to 2 weeks/month as needed. Patient denies needing refills at this time.
Detail Level: Detailed
Otc Regimen: Moisturize daily

## 2019-04-04 NOTE — PROCEDURE: BODY PHOTOGRAPHY
Detail Level: Generalized
Was The Entire Body Photographed (Cannot Bill Unless Entire Body Photographed)?: No
Whole Body Statement: The whole body was photographed today.
Reason For Photography: The patient is obtaining body photography to observe existing suspicious moles and or monitor for the appearance of any new lesions.
Consent: Written consent obtained, risks reviewed for whole body photography. Patient understands that photograph costs may not be covered by insurance, and patient is ultimately responsible for payment.
Number Of Photographs (Optional- Will Not Render If 0): 2

## 2019-04-04 NOTE — PROCEDURE: PATHOLOGY BILLING
Immunohistochemistry ([id number] and [id number]) billing is not performed here. Please use the Immunohistochemistry Stain Billing plan to accomplish this. Immunohistochemistry ([id number] and [id number]) billing is not performed here. Please use the Immunohistochemistry Stain Billing plan to accomplish this.

## 2019-05-07 ENCOUNTER — APPOINTMENT (RX ONLY)
Dept: URBAN - METROPOLITAN AREA CLINIC 349 | Facility: CLINIC | Age: 79
Setting detail: DERMATOLOGY
End: 2019-05-07

## 2019-05-07 DIAGNOSIS — L82.1 OTHER SEBORRHEIC KERATOSIS: ICD-10-CM

## 2019-05-07 PROBLEM — D04.62 CARCINOMA IN SITU OF SKIN OF LEFT UPPER LIMB, INCLUDING SHOULDER: Status: ACTIVE | Noted: 2019-05-07

## 2019-05-07 PROCEDURE — ? CURETTAGE AND DESTRUCTION

## 2019-05-07 PROCEDURE — ? COUNSELING

## 2019-05-07 PROCEDURE — 17263 DSTRJ MAL LES T/A/L 2.1-3.0: CPT

## 2019-05-07 PROCEDURE — ? OTHER

## 2019-05-07 PROCEDURE — 99213 OFFICE O/P EST LOW 20 MIN: CPT | Mod: 25

## 2019-05-07 PROCEDURE — A4550 SURGICAL TRAYS: HCPCS

## 2019-05-07 ASSESSMENT — LOCATION SIMPLE DESCRIPTION DERM: LOCATION SIMPLE: LEFT UPPER ARM

## 2019-05-07 ASSESSMENT — LOCATION ZONE DERM: LOCATION ZONE: ARM

## 2019-05-07 ASSESSMENT — LOCATION DETAILED DESCRIPTION DERM: LOCATION DETAILED: LEFT LATERAL PROXIMAL UPPER ARM

## 2019-05-07 NOTE — PROCEDURE: CURETTAGE AND DESTRUCTION
Anesthesia Volume In Cc: 3
Render Post-Care Instructions In Note?: yes
Size Of Lesion In Cm: 2.5
Detail Level: Detailed
Consent was obtained from the patient. The risks, benefits and alternatives to therapy were discussed in detail. Specifically, the risks of infection, scarring, bleeding, prolonged wound healing, nerve injury, incomplete removal, allergy to anesthesia and recurrence were addressed. Alternatives to ED&C, such as: surgical removal and XRT were also discussed.  Prior to the procedure, the treatment site was clearly identified and confirmed by the patient. All components of Universal Protocol/PAUSE Rule completed.
Add Intralesional Injection: No
Post-Care Instructions: I reviewed with the patient in detail post-care instructions. Patient is to keep the area dry for 48 hours, and not to engage in any swimming until the area is healed. Should the patient develop any fevers, chills, bleeding, severe pain patient will contact the office immediately. After the procedure, the patient was oriented to person, place, and time. Patient denied feeling dizzy, queasy, and and declined further observation after initial 5 minute observation time.
Additional Information: (Optional): The wound was cleaned, and a pressure dressing was applied.  The patient received detailed post-op instructions.
Anesthesia Type: 1% lidocaine with epinephrine and a 1:10 solution of 8.4% sodium bicarbonate
What Was Performed First?: Curettage
Cautery Type: electrodesiccation
Total Volume (Ccs): 1
Bill As A Line Item Or As Units: Line Item

## 2019-05-07 NOTE — PROCEDURE: OTHER
Detail Level: Detailed
Note Text (......Xxx Chief Complaint.): This diagnosis correlates with the
Other (Free Text): Patient declines LN2.

## 2019-10-17 ENCOUNTER — APPOINTMENT (RX ONLY)
Dept: URBAN - METROPOLITAN AREA CLINIC 349 | Facility: CLINIC | Age: 79
Setting detail: DERMATOLOGY
End: 2019-10-17

## 2019-10-17 DIAGNOSIS — L30.4 ERYTHEMA INTERTRIGO: ICD-10-CM | Status: WELL CONTROLLED

## 2019-10-17 DIAGNOSIS — Z85.828 PERSONAL HISTORY OF OTHER MALIGNANT NEOPLASM OF SKIN: ICD-10-CM

## 2019-10-17 DIAGNOSIS — D22 MELANOCYTIC NEVI: ICD-10-CM | Status: STABLE

## 2019-10-17 DIAGNOSIS — L30.9 DERMATITIS, UNSPECIFIED: ICD-10-CM | Status: WELL CONTROLLED

## 2019-10-17 DIAGNOSIS — L82.0 INFLAMED SEBORRHEIC KERATOSIS: ICD-10-CM

## 2019-10-17 PROBLEM — F41.9 ANXIETY DISORDER, UNSPECIFIED: Status: ACTIVE | Noted: 2019-10-17

## 2019-10-17 PROBLEM — D22.5 MELANOCYTIC NEVI OF TRUNK: Status: ACTIVE | Noted: 2019-10-17

## 2019-10-17 PROBLEM — D22.61 MELANOCYTIC NEVI OF RIGHT UPPER LIMB, INCLUDING SHOULDER: Status: ACTIVE | Noted: 2019-10-17

## 2019-10-17 PROBLEM — D22.72 MELANOCYTIC NEVI OF LEFT LOWER LIMB, INCLUDING HIP: Status: ACTIVE | Noted: 2019-10-17

## 2019-10-17 PROBLEM — Z85.3 PERSONAL HISTORY OF MALIGNANT NEOPLASM OF BREAST: Status: ACTIVE | Noted: 2019-10-17

## 2019-10-17 PROBLEM — D22.62 MELANOCYTIC NEVI OF LEFT UPPER LIMB, INCLUDING SHOULDER: Status: ACTIVE | Noted: 2019-10-17

## 2019-10-17 PROCEDURE — ? PHOTO-DOCUMENTATION

## 2019-10-17 PROCEDURE — ? LIQUID NITROGEN

## 2019-10-17 PROCEDURE — ? TREATMENT REGIMEN

## 2019-10-17 PROCEDURE — 99214 OFFICE O/P EST MOD 30 MIN: CPT | Mod: 25

## 2019-10-17 PROCEDURE — 17110 DESTRUCTION B9 LES UP TO 14: CPT

## 2019-10-17 PROCEDURE — ? BODY PHOTOGRAPHY

## 2019-10-17 PROCEDURE — ? COUNSELING

## 2019-10-17 PROCEDURE — ? MEDICAL PHOTOGRAPHY REVIEW

## 2019-10-17 ASSESSMENT — LOCATION ZONE DERM
LOCATION ZONE: LEG
LOCATION ZONE: TRUNK
LOCATION ZONE: FEET
LOCATION ZONE: ARM

## 2019-10-17 ASSESSMENT — LOCATION DETAILED DESCRIPTION DERM
LOCATION DETAILED: LEFT LATERAL DORSAL FOOT
LOCATION DETAILED: RIGHT PROXIMAL DORSAL FOREARM
LOCATION DETAILED: LEFT LATERAL ABDOMEN
LOCATION DETAILED: RIGHT LATERAL SUPERIOR CHEST
LOCATION DETAILED: RIGHT SUPERIOR MEDIAL LOWER BACK
LOCATION DETAILED: RIGHT SUPERIOR UPPER BACK
LOCATION DETAILED: LEFT DISTAL DORSAL FOREARM
LOCATION DETAILED: RIGHT INFERIOR MEDIAL UPPER BACK
LOCATION DETAILED: LEFT DISTAL PRETIBIAL REGION
LOCATION DETAILED: LEFT PROXIMAL DORSAL FOREARM
LOCATION DETAILED: LEFT PROXIMAL POSTERIOR UPPER ARM
LOCATION DETAILED: RIGHT LATERAL ABDOMEN
LOCATION DETAILED: RIGHT PROXIMAL LATERAL POSTERIOR UPPER ARM

## 2019-10-17 ASSESSMENT — LOCATION SIMPLE DESCRIPTION DERM
LOCATION SIMPLE: LEFT POSTERIOR UPPER ARM
LOCATION SIMPLE: RIGHT FOREARM
LOCATION SIMPLE: RIGHT POSTERIOR UPPER ARM
LOCATION SIMPLE: ABDOMEN
LOCATION SIMPLE: LEFT FOOT
LOCATION SIMPLE: LEFT PRETIBIAL REGION
LOCATION SIMPLE: LEFT FOREARM
LOCATION SIMPLE: RIGHT LOWER BACK
LOCATION SIMPLE: CHEST
LOCATION SIMPLE: RIGHT UPPER BACK

## 2019-10-17 ASSESSMENT — SEVERITY ASSESSMENT: SEVERITY: CLEAR

## 2019-10-17 NOTE — PROCEDURE: TREATMENT REGIMEN
Detail Level: Detailed
Continue Regimen: Alcortin gel apply to affected area 3-4 times daily as needed, Patient declines needing refills at this time
Continue Regimen: triamcinolone acetonide 0.1 % topical cream BID Sig: Apply twice daily to rash up to 2 weeks/month as needed. Patient denies needing refills at this time.

## 2019-10-17 NOTE — PROCEDURE: BODY PHOTOGRAPHY
Detail Level: Simple
Whole Body Statement: The whole body was photographed today.
Number Of Photographs (Optional- Will Not Render If 0): 2
Reason For Photography: The patient is obtaining body photography to observe existing suspicious moles and or monitor for the appearance of any new lesions.
Was The Entire Body Photographed (Cannot Bill Unless Entire Body Photographed)?: No
Consent: Written consent obtained, risks reviewed for whole body photography. Patient understands that photograph costs may not be covered by insurance, and patient is ultimately responsible for payment.

## 2019-10-17 NOTE — PROCEDURE: LIQUID NITROGEN
Post-Care Instructions: I reviewed with the patient in detail post-care instructions. Patient is to wear sunprotection, and avoid picking at any of the treated lesions. Pt may apply Vaseline to crusted or scabbing areas.
Include Z78.9 (Other Specified Conditions Influencing Health Status) As An Associated Diagnosis?: Yes
Medical Necessity Clause: This procedure was medically necessary because the lesions that were treated were:
Detail Level: Detailed
Render Note In Bullet Format When Appropriate: No
Duration Of Freeze Thaw-Cycle (Seconds): 5-10
Medical Necessity Information: It is in your best interest to select a reason for this procedure from the list below. All of these items fulfill various CMS LCD requirements except the new and changing color options.
Number Of Freeze-Thaw Cycles: 2 freeze-thaw cycles
Consent: The patient's consent was obtained including but not limited to risks of crusting, scabbing, blistering, scarring, darker or lighter pigmentary change, recurrence, incomplete removal and infection.

## 2020-07-01 ENCOUNTER — APPOINTMENT (RX ONLY)
Dept: URBAN - METROPOLITAN AREA CLINIC 349 | Facility: CLINIC | Age: 80
Setting detail: DERMATOLOGY
End: 2020-07-01

## 2020-07-01 DIAGNOSIS — L30.4 ERYTHEMA INTERTRIGO: ICD-10-CM | Status: WELL CONTROLLED

## 2020-07-01 DIAGNOSIS — L82.0 INFLAMED SEBORRHEIC KERATOSIS: ICD-10-CM

## 2020-07-01 DIAGNOSIS — Z12.83 ENCOUNTER FOR SCREENING FOR MALIGNANT NEOPLASM OF SKIN: ICD-10-CM

## 2020-07-01 DIAGNOSIS — D22 MELANOCYTIC NEVI: ICD-10-CM | Status: STABLE

## 2020-07-01 DIAGNOSIS — R20.2 PARESTHESIA OF SKIN: ICD-10-CM

## 2020-07-01 DIAGNOSIS — Z85.828 PERSONAL HISTORY OF OTHER MALIGNANT NEOPLASM OF SKIN: ICD-10-CM

## 2020-07-01 DIAGNOSIS — L30.9 DERMATITIS, UNSPECIFIED: ICD-10-CM | Status: WELL CONTROLLED

## 2020-07-01 PROBLEM — D22.72 MELANOCYTIC NEVI OF LEFT LOWER LIMB, INCLUDING HIP: Status: ACTIVE | Noted: 2020-07-01

## 2020-07-01 PROBLEM — L29.8 OTHER PRURITUS: Status: ACTIVE | Noted: 2020-07-01

## 2020-07-01 PROBLEM — D22.5 MELANOCYTIC NEVI OF TRUNK: Status: ACTIVE | Noted: 2020-07-01

## 2020-07-01 PROCEDURE — 11306 SHAVE SKIN LESION 0.6-1.0 CM: CPT

## 2020-07-01 PROCEDURE — ? COUNSELING

## 2020-07-01 PROCEDURE — ? SHAVE REMOVAL

## 2020-07-01 PROCEDURE — ? TREATMENT REGIMEN

## 2020-07-01 PROCEDURE — 99214 OFFICE O/P EST MOD 30 MIN: CPT | Mod: 25

## 2020-07-01 PROCEDURE — ? MEDICAL PHOTOGRAPHY REVIEW

## 2020-07-01 PROCEDURE — 17110 DESTRUCTION B9 LES UP TO 14: CPT | Mod: 59

## 2020-07-01 PROCEDURE — A4550 SURGICAL TRAYS: HCPCS

## 2020-07-01 PROCEDURE — ? LIQUID NITROGEN

## 2020-07-01 ASSESSMENT — LOCATION SIMPLE DESCRIPTION DERM
LOCATION SIMPLE: LEFT FOREARM
LOCATION SIMPLE: RIGHT UPPER BACK
LOCATION SIMPLE: LEFT UPPER BACK
LOCATION SIMPLE: LEFT FOOT
LOCATION SIMPLE: ABDOMEN
LOCATION SIMPLE: LEFT PRETIBIAL REGION
LOCATION SIMPLE: UPPER BACK
LOCATION SIMPLE: RIGHT LOWER BACK
LOCATION SIMPLE: RIGHT FOREARM

## 2020-07-01 ASSESSMENT — LOCATION DETAILED DESCRIPTION DERM
LOCATION DETAILED: SUPERIOR THORACIC SPINE
LOCATION DETAILED: LEFT DISTAL PRETIBIAL REGION
LOCATION DETAILED: RIGHT INFERIOR MEDIAL UPPER BACK
LOCATION DETAILED: LEFT PROXIMAL DORSAL FOREARM
LOCATION DETAILED: RIGHT LATERAL ABDOMEN
LOCATION DETAILED: LEFT LATERAL ABDOMEN
LOCATION DETAILED: LEFT SUPERIOR UPPER BACK
LOCATION DETAILED: RIGHT SUPERIOR MEDIAL LOWER BACK
LOCATION DETAILED: LEFT LATERAL DORSAL FOOT
LOCATION DETAILED: RIGHT PROXIMAL DORSAL FOREARM
LOCATION DETAILED: RIGHT SUPERIOR UPPER BACK
LOCATION DETAILED: LEFT DISTAL DORSAL FOREARM

## 2020-07-01 ASSESSMENT — LOCATION ZONE DERM
LOCATION ZONE: FEET
LOCATION ZONE: LEG
LOCATION ZONE: TRUNK
LOCATION ZONE: ARM

## 2020-07-01 ASSESSMENT — SEVERITY ASSESSMENT: SEVERITY: CLEAR

## 2020-07-01 NOTE — PROCEDURE: SHAVE REMOVAL
Add Variable For Additional Medical Justification: No
Post-Care Instructions: I reviewed with the patient in detail post-care instructions. Patient is to keep the biopsy site dry overnight, and then apply Vaseline daily until healed. Patient may apply hydrogen peroxide soaks to remove any crusting. After the procedure, the patient was oriented to person, place, and time. Patient denied feeling dizzy, queasy, and and declined further observation after initial 5 minute observation time.
Anesthesia Volume In Cc: 1.5
Medical Necessity Information: It is in your best interest to select a reason for this procedure from the list below. All of these items fulfill various CMS LCD requirements except the new and changing color options.
Hemostasis: Aluminum Chloride
Medical Necessity Clause: This procedure was medically necessary because the lesion has a history of change
Was A Bandage Applied: Yes
Size Of Lesion In Cm (Required): 0.7
Notification Instructions: Patient will be notified of biopsy results. However, patient instructed to call the office if not contacted within 2 weeks.
Accession #: [facility]
Detail Level: Detailed
Biopsy Method: Personna blade
X Size Of Lesion In Cm (Optional): 0
Billing Type: Third-Party Bill
Consent was obtained from the patient. The risks and benefits to therapy were discussed in detail. Specifically, the risks of infection, scarring, bleeding, prolonged wound healing, incomplete removal, allergy to anesthesia, nerve injury and recurrence were addressed. Prior to the procedure, the treatment site was clearly identified and confirmed by the patient. All components of Universal Protocol/PAUSE Rule completed.
Wound Care: Vaseline

## 2020-07-01 NOTE — PROCEDURE: LIQUID NITROGEN
Medical Necessity Information: It is in your best interest to select a reason for this procedure from the list below. All of these items fulfill various CMS LCD requirements except the new and changing color options.
Add 52 Modifier (Optional): no
Number Of Freeze-Thaw Cycles: 2 freeze-thaw cycles
Include Z78.9 (Other Specified Conditions Influencing Health Status) As An Associated Diagnosis?: Yes
Medical Necessity Clause: This procedure was medically necessary because the lesions that were treated were:
Consent: The patient's consent was obtained including but not limited to risks of crusting, scabbing, blistering, scarring, darker or lighter pigmentary change, recurrence, incomplete removal and infection.
Duration Of Freeze Thaw-Cycle (Seconds): 5-10
Detail Level: Detailed
Post-Care Instructions: I reviewed with the patient in detail post-care instructions. Patient is to wear sunprotection, and avoid picking at any of the treated lesions. Pt may apply Vaseline to crusted or scabbing areas.

## 2020-07-01 NOTE — PROCEDURE: COUNSELING
Render Path Notes In Note?: No
Detail Level: Simple
Billing Type: Third-Party Bill
Wound Care: Vaseline
Detail Level: Detailed
Consent was obtained from the patient. The risks and benefits to therapy were discussed in detail. Specifically, the risks of infection, scarring, bleeding, prolonged wound healing, incomplete removal, allergy to anesthesia, nerve injury and recurrence were addressed. Prior to the procedure, the treatment site was clearly identified and confirmed by the patient. All components of Universal Protocol/PAUSE Rule completed.
Post-Care Instructions: I reviewed with the patient in detail post-care instructions. Patient is to keep the biopsy site dry overnight, and then apply Vaseline daily until healed. Patient may apply hydrogen peroxide soaks to remove any crusting. After the procedure, the patient was oriented to person, place, and time. Patient denied feeling dizzy, queasy, and and declined further observation after initial 5 minute observation time.
Was A Bandage Applied: Yes
Detail Level: Generalized
Medical Necessity Information: It is in your best interest to select a reason for this procedure from the list below. All of these items fulfill various CMS LCD requirements except the new and changing color options.
Anesthesia Volume In Cc: 1.5
Hemostasis: Aluminum Chloride
Size Of Lesion In Cm (Required): 0.4
Medical Necessity Clause: This procedure was medically necessary because the lesion has a history of change
Biopsy Method: Personna blade
Notification Instructions: Patient will be notified of biopsy results. However, patient instructed to call the office if not contacted within 2 weeks.
Accession #: [facility]
X Size Of Lesion In Cm (Optional): 0

## 2021-01-01 ENCOUNTER — HOME CARE VISIT (OUTPATIENT)
Dept: SCHEDULING | Facility: HOME HEALTH | Age: 81
End: 2021-01-01
Payer: MEDICARE

## 2021-01-01 ENCOUNTER — HOME CARE VISIT (OUTPATIENT)
Dept: HOSPICE | Facility: HOSPICE | Age: 81
End: 2021-01-01
Payer: MEDICARE

## 2021-01-01 ENCOUNTER — HOSPITAL ENCOUNTER (INPATIENT)
Age: 81
LOS: 6 days | Discharge: HOSPICE | End: 2021-12-07
Attending: INTERNAL MEDICINE | Admitting: INTERNAL MEDICINE

## 2021-01-01 ENCOUNTER — HOSPICE ADMISSION (OUTPATIENT)
Dept: HOSPICE | Facility: HOSPICE | Age: 81
End: 2021-01-01
Payer: MEDICARE

## 2021-01-01 VITALS
TEMPERATURE: 98.2 F | HEART RATE: 116 BPM | OXYGEN SATURATION: 95 % | RESPIRATION RATE: 32 BRPM | DIASTOLIC BLOOD PRESSURE: 77 MMHG | SYSTOLIC BLOOD PRESSURE: 132 MMHG

## 2021-01-01 VITALS
DIASTOLIC BLOOD PRESSURE: 80 MMHG | SYSTOLIC BLOOD PRESSURE: 136 MMHG | HEART RATE: 75 BPM | TEMPERATURE: 97.3 F | RESPIRATION RATE: 16 BRPM

## 2021-01-01 VITALS
BODY MASS INDEX: 29.44 KG/M2 | SYSTOLIC BLOOD PRESSURE: 140 MMHG | DIASTOLIC BLOOD PRESSURE: 80 MMHG | HEART RATE: 88 BPM | RESPIRATION RATE: 19 BRPM | WEIGHT: 160 LBS | HEIGHT: 62 IN | TEMPERATURE: 98.2 F

## 2021-01-01 VITALS
DIASTOLIC BLOOD PRESSURE: 83 MMHG | HEART RATE: 108 BPM | SYSTOLIC BLOOD PRESSURE: 145 MMHG | RESPIRATION RATE: 28 BRPM | TEMPERATURE: 97.9 F

## 2021-01-01 VITALS
DIASTOLIC BLOOD PRESSURE: 82 MMHG | SYSTOLIC BLOOD PRESSURE: 135 MMHG | RESPIRATION RATE: 32 BRPM | TEMPERATURE: 97.6 F | HEART RATE: 98 BPM

## 2021-01-01 VITALS
SYSTOLIC BLOOD PRESSURE: 144 MMHG | DIASTOLIC BLOOD PRESSURE: 81 MMHG | RESPIRATION RATE: 26 BRPM | TEMPERATURE: 98.2 F | HEART RATE: 128 BPM

## 2021-01-01 VITALS
HEART RATE: 92 BPM | SYSTOLIC BLOOD PRESSURE: 138 MMHG | RESPIRATION RATE: 24 BRPM | DIASTOLIC BLOOD PRESSURE: 70 MMHG | TEMPERATURE: 98 F

## 2021-01-01 VITALS
HEART RATE: 114 BPM | DIASTOLIC BLOOD PRESSURE: 98 MMHG | SYSTOLIC BLOOD PRESSURE: 158 MMHG | TEMPERATURE: 97.3 F | RESPIRATION RATE: 24 BRPM

## 2021-01-01 VITALS
TEMPERATURE: 99 F | DIASTOLIC BLOOD PRESSURE: 50 MMHG | SYSTOLIC BLOOD PRESSURE: 90 MMHG | HEART RATE: 80 BPM | RESPIRATION RATE: 22 BRPM

## 2021-01-01 VITALS
DIASTOLIC BLOOD PRESSURE: 102 MMHG | SYSTOLIC BLOOD PRESSURE: 144 MMHG | RESPIRATION RATE: 28 BRPM | TEMPERATURE: 98 F | HEART RATE: 108 BPM

## 2021-01-01 VITALS
HEART RATE: 95 BPM | SYSTOLIC BLOOD PRESSURE: 155 MMHG | TEMPERATURE: 98.4 F | DIASTOLIC BLOOD PRESSURE: 80 MMHG | RESPIRATION RATE: 20 BRPM

## 2021-01-01 VITALS — HEART RATE: 98 BPM | RESPIRATION RATE: 18 BRPM | TEMPERATURE: 98 F

## 2021-01-01 DIAGNOSIS — C50.919 METASTATIC BREAST CANCER (HCC): Primary | ICD-10-CM

## 2021-01-01 DIAGNOSIS — F41.9 ANXIETY: Primary | ICD-10-CM

## 2021-01-01 DIAGNOSIS — G89.3 CANCER RELATED PAIN: Primary | ICD-10-CM

## 2021-01-01 PROCEDURE — 74011636637 HC RX REV CODE- 636/637: Performed by: NURSE PRACTITIONER

## 2021-01-01 PROCEDURE — G0299 HHS/HOSPICE OF RN EA 15 MIN: HCPCS

## 2021-01-01 PROCEDURE — 74011250636 HC RX REV CODE- 250/636: Performed by: FAMILY MEDICINE

## 2021-01-01 PROCEDURE — 0651 HSPC ROUTINE HOME CARE

## 2021-01-01 PROCEDURE — 74011250637 HC RX REV CODE- 250/637: Performed by: NURSE PRACTITIONER

## 2021-01-01 PROCEDURE — 74011000250 HC RX REV CODE- 250: Performed by: INTERNAL MEDICINE

## 2021-01-01 PROCEDURE — HOSPICE MEDICATION HC HH HOSPICE MEDICATION

## 2021-01-01 PROCEDURE — 74011250637 HC RX REV CODE- 250/637: Performed by: INTERNAL MEDICINE

## 2021-01-01 PROCEDURE — 0656 HSPC GENERAL INPATIENT

## 2021-01-01 PROCEDURE — 74011250636 HC RX REV CODE- 250/636: Performed by: INTERNAL MEDICINE

## 2021-01-01 PROCEDURE — 3336500001 HSPC ELECTION

## 2021-01-01 PROCEDURE — G0155 HHCP-SVS OF CSW,EA 15 MIN: HCPCS

## 2021-01-01 PROCEDURE — 3331090004 HSPC SERVICE INTENSITY ADD-ON

## 2021-01-01 PROCEDURE — 74011250637 HC RX REV CODE- 250/637: Performed by: FAMILY MEDICINE

## 2021-01-01 RX ORDER — GLYCOPYRROLATE 0.2 MG/ML
0.2 INJECTION INTRAMUSCULAR; INTRAVENOUS
Status: DISCONTINUED | OUTPATIENT
Start: 2021-01-01 | End: 2021-01-01

## 2021-01-01 RX ORDER — SODIUM CHLORIDE 0.9 % (FLUSH) 0.9 %
3 SYRINGE (ML) INJECTION AS NEEDED
Status: DISCONTINUED | OUTPATIENT
Start: 2021-01-01 | End: 2021-01-01

## 2021-01-01 RX ORDER — LORAZEPAM 0.5 MG/1
0.5 TABLET ORAL EVERY 8 HOURS
Status: DISCONTINUED | OUTPATIENT
Start: 2021-01-01 | End: 2021-01-01

## 2021-01-01 RX ORDER — HALOPERIDOL 5 MG/ML
2 INJECTION INTRAMUSCULAR
Status: DISCONTINUED | OUTPATIENT
Start: 2021-01-01 | End: 2021-01-01

## 2021-01-01 RX ORDER — DEXAMETHASONE 4 MG/1
2 TABLET ORAL 2 TIMES DAILY
Status: DISCONTINUED | OUTPATIENT
Start: 2021-01-01 | End: 2021-01-01 | Stop reason: HOSPADM

## 2021-01-01 RX ORDER — LORAZEPAM 1 MG/1
1 TABLET ORAL EVERY 4 HOURS
Status: DISCONTINUED | OUTPATIENT
Start: 2021-01-01 | End: 2021-01-01 | Stop reason: HOSPADM

## 2021-01-01 RX ORDER — DEXAMETHASONE 4 MG/1
2 TABLET ORAL DAILY
Status: DISCONTINUED | OUTPATIENT
Start: 2021-01-01 | End: 2021-01-01

## 2021-01-01 RX ORDER — LORAZEPAM 2 MG/ML
2 INJECTION INTRAMUSCULAR
Status: DISCONTINUED | OUTPATIENT
Start: 2021-01-01 | End: 2021-01-01

## 2021-01-01 RX ORDER — PREDNISONE 10 MG/1
10 TABLET ORAL
Qty: 14 TABLET | Refills: 1 | Status: ON HOLD | OUTPATIENT
Start: 2021-01-01 | End: 2021-01-01 | Stop reason: SDUPTHER

## 2021-01-01 RX ORDER — MORPHINE SULFATE 4 MG/ML
4 INJECTION INTRAVENOUS
Status: DISCONTINUED | OUTPATIENT
Start: 2021-01-01 | End: 2021-01-01

## 2021-01-01 RX ORDER — DEXAMETHASONE SODIUM PHOSPHATE 4 MG/ML
4 INJECTION, SOLUTION INTRA-ARTICULAR; INTRALESIONAL; INTRAMUSCULAR; INTRAVENOUS; SOFT TISSUE EVERY 12 HOURS
Status: DISCONTINUED | OUTPATIENT
Start: 2021-01-01 | End: 2021-01-01

## 2021-01-01 RX ORDER — LORAZEPAM 1 MG/1
2 TABLET ORAL
Status: DISCONTINUED | OUTPATIENT
Start: 2021-01-01 | End: 2021-01-01

## 2021-01-01 RX ORDER — PREDNISONE 20 MG/1
20 TABLET ORAL
Qty: 14 TABLET | Refills: 0 | Status: SHIPPED | OUTPATIENT
Start: 2021-01-01 | End: 2021-01-01

## 2021-01-01 RX ORDER — OMEPRAZOLE 40 MG/1
40 CAPSULE, DELAYED RELEASE ORAL 2 TIMES DAILY
Status: DISCONTINUED | OUTPATIENT
Start: 2021-01-01 | End: 2021-01-01 | Stop reason: HOSPADM

## 2021-01-01 RX ORDER — PANTOPRAZOLE SODIUM 40 MG/1
40 TABLET, DELAYED RELEASE ORAL
Status: DISCONTINUED | OUTPATIENT
Start: 2021-01-01 | End: 2021-01-01

## 2021-01-01 RX ORDER — SENNOSIDES 8.6 MG/1
1 TABLET ORAL 2 TIMES DAILY
Status: DISCONTINUED | OUTPATIENT
Start: 2021-01-01 | End: 2021-01-01 | Stop reason: HOSPADM

## 2021-01-01 RX ORDER — CARVEDILOL 6.25 MG/1
6.25 TABLET ORAL 2 TIMES DAILY WITH MEALS
Status: DISCONTINUED | OUTPATIENT
Start: 2021-01-01 | End: 2021-01-01

## 2021-01-01 RX ORDER — FENTANYL 12.5 UG/1
1 PATCH TRANSDERMAL
Status: DISCONTINUED | OUTPATIENT
Start: 2021-01-01 | End: 2021-01-01

## 2021-01-01 RX ORDER — CIPROFLOXACIN 500 MG/1
250 TABLET ORAL 2 TIMES DAILY
Status: DISCONTINUED | OUTPATIENT
Start: 2021-01-01 | End: 2021-01-01

## 2021-01-01 RX ORDER — FENTANYL 25 UG/1
1 PATCH TRANSDERMAL
Qty: 5 PATCH | Refills: 0 | Status: SHIPPED | OUTPATIENT
Start: 2021-01-01 | End: 2022-01-06

## 2021-01-01 RX ORDER — LOPERAMIDE HYDROCHLORIDE 2 MG/1
4 CAPSULE ORAL AS NEEDED
Status: DISCONTINUED | OUTPATIENT
Start: 2021-01-01 | End: 2021-01-01

## 2021-01-01 RX ORDER — SENNOSIDES 8.6 MG/1
1 TABLET ORAL 2 TIMES DAILY
Status: DISCONTINUED | OUTPATIENT
Start: 2021-01-01 | End: 2021-01-01

## 2021-01-01 RX ORDER — ACETAMINOPHEN 325 MG/1
650 TABLET ORAL
Status: DISCONTINUED | OUTPATIENT
Start: 2021-01-01 | End: 2021-01-01 | Stop reason: HOSPADM

## 2021-01-01 RX ORDER — MORPHINE SULFATE 100 MG/5ML
10 SOLUTION ORAL
Status: DISCONTINUED | OUTPATIENT
Start: 2021-01-01 | End: 2021-01-01

## 2021-01-01 RX ORDER — MORPHINE SULFATE 2 MG/ML
1 INJECTION, SOLUTION INTRAMUSCULAR; INTRAVENOUS
Status: DISCONTINUED | OUTPATIENT
Start: 2021-01-01 | End: 2021-01-01

## 2021-01-01 RX ORDER — ACETAMINOPHEN 650 MG/1
650 SUPPOSITORY RECTAL
Status: DISCONTINUED | OUTPATIENT
Start: 2021-01-01 | End: 2021-01-01

## 2021-01-01 RX ORDER — FENTANYL 25 UG/1
1 PATCH TRANSDERMAL
Status: DISCONTINUED | OUTPATIENT
Start: 2021-01-01 | End: 2021-01-01 | Stop reason: HOSPADM

## 2021-01-01 RX ORDER — FENTANYL 25 UG/1
1 PATCH TRANSDERMAL
Status: DISCONTINUED | OUTPATIENT
Start: 2021-01-01 | End: 2021-01-01

## 2021-01-01 RX ORDER — PREDNISONE 10 MG/1
10 TABLET ORAL
Status: DISCONTINUED | OUTPATIENT
Start: 2021-01-01 | End: 2021-01-01

## 2021-01-01 RX ORDER — LORAZEPAM 0.5 MG/1
0.5 TABLET ORAL
Status: DISCONTINUED | OUTPATIENT
Start: 2021-01-01 | End: 2021-01-01

## 2021-01-01 RX ORDER — PREDNISONE 10 MG/1
5 TABLET ORAL 2 TIMES DAILY WITH MEALS
Status: DISCONTINUED | OUTPATIENT
Start: 2021-01-01 | End: 2021-01-01

## 2021-01-01 RX ORDER — MAG HYDROX/ALUMINUM HYD/SIMETH 200-200-20
30 SUSPENSION, ORAL (FINAL DOSE FORM) ORAL
Status: DISCONTINUED | OUTPATIENT
Start: 2021-01-01 | End: 2021-01-01 | Stop reason: HOSPADM

## 2021-01-01 RX ORDER — OXYCODONE HYDROCHLORIDE 5 MG/1
7.5 TABLET ORAL
Status: DISCONTINUED | OUTPATIENT
Start: 2021-01-01 | End: 2021-01-01

## 2021-01-01 RX ORDER — MUPIROCIN 20 MG/G
1 OINTMENT TOPICAL
Qty: 22 G | Refills: 0 | Status: SHIPPED | OUTPATIENT
Start: 2021-01-01 | End: 2021-01-01

## 2021-01-01 RX ORDER — OXYCODONE HYDROCHLORIDE 10 MG/1
10 TABLET ORAL
Status: DISCONTINUED | OUTPATIENT
Start: 2021-01-01 | End: 2021-01-01 | Stop reason: HOSPADM

## 2021-01-01 RX ORDER — OXYCODONE HYDROCHLORIDE 10 MG/1
10 TABLET ORAL
Qty: 30 TABLET | Refills: 0 | Status: SHIPPED | OUTPATIENT
Start: 2021-01-01 | End: 2021-01-01

## 2021-01-01 RX ORDER — LORAZEPAM 1 MG/1
1 TABLET ORAL
Qty: 40 TABLET | Refills: 2 | Status: SHIPPED | OUTPATIENT
Start: 2021-01-01 | End: 2021-01-01 | Stop reason: SDUPTHER

## 2021-01-01 RX ORDER — CARVEDILOL 6.25 MG/1
6.25 TABLET ORAL 2 TIMES DAILY
Status: DISCONTINUED | OUTPATIENT
Start: 2021-01-01 | End: 2021-01-01 | Stop reason: HOSPADM

## 2021-01-01 RX ORDER — HYOSCYAMINE SULFATE 0.12 MG/1
0.12 TABLET SUBLINGUAL
Status: DISCONTINUED | OUTPATIENT
Start: 2021-01-01 | End: 2021-01-01

## 2021-01-01 RX ADMIN — LORAZEPAM 1 MG: 1 TABLET ORAL at 09:18

## 2021-01-01 RX ADMIN — OXYCODONE HYDROCHLORIDE 10 MG: 10 TABLET ORAL at 18:22

## 2021-01-01 RX ADMIN — SENNOSIDES 8.6 MG: 8.6 TABLET, FILM COATED ORAL at 12:04

## 2021-01-01 RX ADMIN — CARVEDILOL 6.25 MG: 6.25 TABLET, FILM COATED ORAL at 12:38

## 2021-01-01 RX ADMIN — LORAZEPAM 1 MG: 1 TABLET ORAL at 20:24

## 2021-01-01 RX ADMIN — OXYCODONE HYDROCHLORIDE 10 MG: 10 TABLET ORAL at 23:08

## 2021-01-01 RX ADMIN — LORAZEPAM 1 MG: 1 TABLET ORAL at 04:21

## 2021-01-01 RX ADMIN — OXYCODONE HYDROCHLORIDE 10 MG: 10 TABLET ORAL at 04:36

## 2021-01-01 RX ADMIN — LORAZEPAM 1 MG: 1 TABLET ORAL at 23:02

## 2021-01-01 RX ADMIN — CARVEDILOL 6.25 MG: 6.25 TABLET, FILM COATED ORAL at 11:33

## 2021-01-01 RX ADMIN — LORAZEPAM 1 MG: 1 TABLET ORAL at 16:38

## 2021-01-01 RX ADMIN — LORAZEPAM 1 MG: 1 TABLET ORAL at 12:18

## 2021-01-01 RX ADMIN — HALOPERIDOL LACTATE 2 MG: 5 INJECTION, SOLUTION INTRAMUSCULAR at 15:48

## 2021-01-01 RX ADMIN — DEXAMETHASONE 2 MG: 4 TABLET ORAL at 15:26

## 2021-01-01 RX ADMIN — LORAZEPAM 1 MG: 1 TABLET ORAL at 23:45

## 2021-01-01 RX ADMIN — CARVEDILOL 6.25 MG: 6.25 TABLET, FILM COATED ORAL at 19:41

## 2021-01-01 RX ADMIN — LORAZEPAM 1 MG: 1 TABLET ORAL at 23:57

## 2021-01-01 RX ADMIN — DEXAMETHASONE 2 MG: 4 TABLET ORAL at 09:18

## 2021-01-01 RX ADMIN — OMEPRAZOLE 40 MG: 40 CAPSULE, DELAYED RELEASE PELLETS ORAL at 19:31

## 2021-01-01 RX ADMIN — CARVEDILOL 6.25 MG: 6.25 TABLET, FILM COATED ORAL at 21:00

## 2021-01-01 RX ADMIN — OXYCODONE HYDROCHLORIDE 10 MG: 10 TABLET ORAL at 21:00

## 2021-01-01 RX ADMIN — OXYCODONE HYDROCHLORIDE 10 MG: 10 TABLET ORAL at 17:14

## 2021-01-01 RX ADMIN — DEXAMETHASONE 2 MG: 4 TABLET ORAL at 09:29

## 2021-01-01 RX ADMIN — DEXAMETHASONE 2 MG: 4 TABLET ORAL at 08:22

## 2021-01-01 RX ADMIN — SENNOSIDES 8.6 MG: 8.6 TABLET, FILM COATED ORAL at 20:19

## 2021-01-01 RX ADMIN — LORAZEPAM 1 MG: 1 TABLET ORAL at 15:26

## 2021-01-01 RX ADMIN — OXYCODONE HYDROCHLORIDE 10 MG: 10 TABLET ORAL at 13:50

## 2021-01-01 RX ADMIN — CIPROFLOXACIN HYDROCHLORIDE 250 MG: 500 TABLET, FILM COATED ORAL at 15:00

## 2021-01-01 RX ADMIN — OXYCODONE HYDROCHLORIDE 10 MG: 10 TABLET ORAL at 11:33

## 2021-01-01 RX ADMIN — SENNOSIDES 8.6 MG: 8.6 TABLET, FILM COATED ORAL at 21:00

## 2021-01-01 RX ADMIN — OXYCODONE HYDROCHLORIDE 10 MG: 10 TABLET ORAL at 09:29

## 2021-01-01 RX ADMIN — LORAZEPAM 1 MG: 1 TABLET ORAL at 03:56

## 2021-01-01 RX ADMIN — OXYCODONE HYDROCHLORIDE 10 MG: 10 TABLET ORAL at 04:21

## 2021-01-01 RX ADMIN — CARVEDILOL 6.25 MG: 6.25 TABLET, FILM COATED ORAL at 11:25

## 2021-01-01 RX ADMIN — OMEPRAZOLE 40 MG: 40 CAPSULE, DELAYED RELEASE PELLETS ORAL at 07:30

## 2021-01-01 RX ADMIN — OMEPRAZOLE 40 MG: 40 CAPSULE, DELAYED RELEASE PELLETS ORAL at 08:10

## 2021-01-01 RX ADMIN — LORAZEPAM 0.5 MG: 0.5 TABLET ORAL at 21:00

## 2021-01-01 RX ADMIN — LORAZEPAM 1 MG: 1 TABLET ORAL at 08:01

## 2021-01-01 RX ADMIN — OXYCODONE HYDROCHLORIDE 10 MG: 10 TABLET ORAL at 05:17

## 2021-01-01 RX ADMIN — OXYCODONE HYDROCHLORIDE 10 MG: 10 TABLET ORAL at 21:14

## 2021-01-01 RX ADMIN — OXYCODONE HYDROCHLORIDE 10 MG: 10 TABLET ORAL at 15:26

## 2021-01-01 RX ADMIN — OXYCODONE HYDROCHLORIDE 10 MG: 10 TABLET ORAL at 19:41

## 2021-01-01 RX ADMIN — OMEPRAZOLE 40 MG: 40 CAPSULE, DELAYED RELEASE PELLETS ORAL at 16:37

## 2021-01-01 RX ADMIN — LORAZEPAM 0.5 MG: 0.5 TABLET ORAL at 11:49

## 2021-01-01 RX ADMIN — OMEPRAZOLE 40 MG: 40 CAPSULE, DELAYED RELEASE PELLETS ORAL at 06:58

## 2021-01-01 RX ADMIN — PREDNISONE 10 MG: 10 TABLET ORAL at 15:00

## 2021-01-01 RX ADMIN — LORAZEPAM 0.5 MG: 0.5 TABLET ORAL at 05:20

## 2021-01-01 RX ADMIN — DEXAMETHASONE 2 MG: 4 TABLET ORAL at 16:38

## 2021-01-01 RX ADMIN — SENNOSIDES 8.6 MG: 8.6 TABLET, FILM COATED ORAL at 11:33

## 2021-01-01 RX ADMIN — SENNOSIDES 8.6 MG: 8.6 TABLET, FILM COATED ORAL at 12:05

## 2021-01-01 RX ADMIN — OXYCODONE HYDROCHLORIDE 10 MG: 10 TABLET ORAL at 20:18

## 2021-01-01 RX ADMIN — LORAZEPAM 1 MG: 1 TABLET ORAL at 11:25

## 2021-01-01 RX ADMIN — CARVEDILOL 6.25 MG: 6.25 TABLET, FILM COATED ORAL at 20:25

## 2021-01-01 RX ADMIN — DEXAMETHASONE 2 MG: 4 TABLET ORAL at 07:59

## 2021-01-01 RX ADMIN — LORAZEPAM 1 MG: 1 TABLET ORAL at 12:05

## 2021-01-01 RX ADMIN — LORAZEPAM 1 MG: 1 TABLET ORAL at 19:47

## 2021-01-01 RX ADMIN — OXYCODONE HYDROCHLORIDE 10 MG: 10 TABLET ORAL at 02:08

## 2021-01-01 RX ADMIN — LORAZEPAM 1 MG: 1 TABLET ORAL at 17:15

## 2021-01-01 RX ADMIN — OXYCODONE HYDROCHLORIDE 10 MG: 10 TABLET ORAL at 05:42

## 2021-01-01 RX ADMIN — OXYCODONE HYDROCHLORIDE 10 MG: 10 TABLET ORAL at 12:44

## 2021-01-01 RX ADMIN — SENNOSIDES 8.6 MG: 8.6 TABLET, FILM COATED ORAL at 11:25

## 2021-01-01 RX ADMIN — OMEPRAZOLE 40 MG: 40 CAPSULE, DELAYED RELEASE PELLETS ORAL at 15:26

## 2021-01-01 RX ADMIN — OXYCODONE HYDROCHLORIDE 10 MG: 10 TABLET ORAL at 17:57

## 2021-01-01 RX ADMIN — OXYCODONE HYDROCHLORIDE 10 MG: 10 TABLET ORAL at 09:38

## 2021-01-01 RX ADMIN — SENNOSIDES 8.6 MG: 8.6 TABLET, FILM COATED ORAL at 19:41

## 2021-01-01 RX ADMIN — LORAZEPAM 1 MG: 1 TABLET ORAL at 05:42

## 2021-01-01 RX ADMIN — CARVEDILOL 6.25 MG: 6.25 TABLET, FILM COATED ORAL at 11:50

## 2021-01-01 RX ADMIN — OXYCODONE HYDROCHLORIDE 10 MG: 10 TABLET ORAL at 07:31

## 2021-01-01 RX ADMIN — SENNOSIDES 8.6 MG: 8.6 TABLET, FILM COATED ORAL at 20:24

## 2021-01-01 RX ADMIN — LORAZEPAM 1 MG: 1 TABLET ORAL at 12:04

## 2021-01-01 RX ADMIN — LORAZEPAM 0.5 MG: 0.5 TABLET ORAL at 17:07

## 2021-01-01 RX ADMIN — CARVEDILOL 6.25 MG: 6.25 TABLET, FILM COATED ORAL at 20:18

## 2021-01-01 RX ADMIN — CIPROFLOXACIN HYDROCHLORIDE 250 MG: 500 TABLET, FILM COATED ORAL at 11:50

## 2021-01-01 RX ADMIN — CIPROFLOXACIN HYDROCHLORIDE 250 MG: 500 TABLET, FILM COATED ORAL at 20:18

## 2021-01-01 RX ADMIN — CARVEDILOL 6.25 MG: 6.25 TABLET, FILM COATED ORAL at 19:47

## 2021-01-01 RX ADMIN — CIPROFLOXACIN HYDROCHLORIDE 250 MG: 500 TABLET, FILM COATED ORAL at 21:00

## 2021-01-01 RX ADMIN — LORAZEPAM 1 MG: 1 TABLET ORAL at 19:41

## 2021-01-01 RX ADMIN — SENNOSIDES 8.6 MG: 8.6 TABLET, FILM COATED ORAL at 11:50

## 2021-01-01 RX ADMIN — OXYCODONE HYDROCHLORIDE 10 MG: 10 TABLET ORAL at 17:07

## 2021-01-01 RX ADMIN — OXYCODONE HYDROCHLORIDE 10 MG: 10 TABLET ORAL at 16:38

## 2021-01-01 RX ADMIN — OMEPRAZOLE 40 MG: 40 CAPSULE, DELAYED RELEASE PELLETS ORAL at 08:01

## 2021-01-01 RX ADMIN — CIPROFLOXACIN HYDROCHLORIDE 250 MG: 500 TABLET, FILM COATED ORAL at 20:19

## 2021-01-01 RX ADMIN — LORAZEPAM 1 MG: 1 TABLET ORAL at 04:36

## 2021-01-01 RX ADMIN — LORAZEPAM 1 MG: 1 TABLET ORAL at 16:36

## 2021-01-01 RX ADMIN — OXYCODONE HYDROCHLORIDE 10 MG: 10 TABLET ORAL at 23:45

## 2021-01-01 RX ADMIN — CARVEDILOL 6.25 MG: 6.25 TABLET, FILM COATED ORAL at 20:19

## 2021-01-01 RX ADMIN — OXYCODONE HYDROCHLORIDE 10 MG: 10 TABLET ORAL at 12:05

## 2021-01-01 RX ADMIN — OXYCODONE HYDROCHLORIDE 10 MG: 10 TABLET ORAL at 23:02

## 2021-01-01 RX ADMIN — LORAZEPAM 1 MG: 1 TABLET ORAL at 08:22

## 2021-01-01 RX ADMIN — OXYCODONE HYDROCHLORIDE 10 MG: 10 TABLET ORAL at 21:32

## 2021-01-01 RX ADMIN — DEXAMETHASONE 2 MG: 4 TABLET ORAL at 11:34

## 2021-01-01 RX ADMIN — LORAZEPAM 0.5 MG: 0.5 TABLET ORAL at 20:19

## 2021-01-01 RX ADMIN — LORAZEPAM 1 MG: 1 TABLET ORAL at 07:31

## 2021-01-01 RX ADMIN — CARVEDILOL 6.25 MG: 6.25 TABLET, FILM COATED ORAL at 12:05

## 2021-01-01 RX ADMIN — DEXAMETHASONE 2 MG: 4 TABLET ORAL at 08:58

## 2021-01-01 RX ADMIN — LORAZEPAM 0.5 MG: 0.5 TABLET ORAL at 00:14

## 2021-01-01 RX ADMIN — OXYCODONE HYDROCHLORIDE 10 MG: 10 TABLET ORAL at 11:25

## 2021-01-01 RX ADMIN — CIPROFLOXACIN HYDROCHLORIDE 250 MG: 500 TABLET, FILM COATED ORAL at 11:33

## 2021-01-01 RX ADMIN — DEXAMETHASONE 2 MG: 4 TABLET ORAL at 16:36

## 2021-01-01 RX ADMIN — OMEPRAZOLE 40 MG: 40 CAPSULE, DELAYED RELEASE PELLETS ORAL at 07:48

## 2021-01-01 RX ADMIN — CARVEDILOL 6.25 MG: 6.25 TABLET, FILM COATED ORAL at 12:04

## 2021-01-01 RX ADMIN — LORAZEPAM 1 MG: 1 TABLET ORAL at 20:18

## 2021-01-07 ENCOUNTER — APPOINTMENT (RX ONLY)
Dept: URBAN - METROPOLITAN AREA CLINIC 349 | Facility: CLINIC | Age: 81
Setting detail: DERMATOLOGY
End: 2021-01-07

## 2021-01-07 DIAGNOSIS — L30.4 ERYTHEMA INTERTRIGO: ICD-10-CM | Status: STABLE

## 2021-01-07 DIAGNOSIS — Z85.828 PERSONAL HISTORY OF OTHER MALIGNANT NEOPLASM OF SKIN: ICD-10-CM

## 2021-01-07 DIAGNOSIS — L57.0 ACTINIC KERATOSIS: ICD-10-CM

## 2021-01-07 DIAGNOSIS — L30.9 DERMATITIS, UNSPECIFIED: ICD-10-CM | Status: WELL CONTROLLED

## 2021-01-07 DIAGNOSIS — L85.3 XEROSIS CUTIS: ICD-10-CM

## 2021-01-07 DIAGNOSIS — L82.1 OTHER SEBORRHEIC KERATOSIS: ICD-10-CM

## 2021-01-07 DIAGNOSIS — L82.0 INFLAMED SEBORRHEIC KERATOSIS: ICD-10-CM

## 2021-01-07 DIAGNOSIS — D22 MELANOCYTIC NEVI: ICD-10-CM | Status: STABLE

## 2021-01-07 PROBLEM — D22.5 MELANOCYTIC NEVI OF TRUNK: Status: ACTIVE | Noted: 2021-01-07

## 2021-01-07 PROBLEM — J30.1 ALLERGIC RHINITIS DUE TO POLLEN: Status: ACTIVE | Noted: 2021-01-07

## 2021-01-07 PROCEDURE — ? OTHER

## 2021-01-07 PROCEDURE — ? MEDICAL PHOTOGRAPHY REVIEW

## 2021-01-07 PROCEDURE — ? PRESCRIPTION MEDICATION MANAGEMENT

## 2021-01-07 PROCEDURE — ? COUNSELING

## 2021-01-07 PROCEDURE — ? LIQUID NITROGEN

## 2021-01-07 PROCEDURE — 17110 DESTRUCTION B9 LES UP TO 14: CPT

## 2021-01-07 PROCEDURE — 99214 OFFICE O/P EST MOD 30 MIN: CPT | Mod: 25

## 2021-01-07 ASSESSMENT — LOCATION DETAILED DESCRIPTION DERM
LOCATION DETAILED: LEFT PROXIMAL DORSAL FOREARM
LOCATION DETAILED: RIGHT DISTAL DORSAL FOREARM
LOCATION DETAILED: LEFT DISTAL DORSAL FOREARM
LOCATION DETAILED: RIGHT PROXIMAL DORSAL FOREARM
LOCATION DETAILED: RIGHT SUPERIOR MEDIAL LOWER BACK
LOCATION DETAILED: LEFT LATERAL ABDOMEN
LOCATION DETAILED: RIGHT LATERAL ABDOMEN
LOCATION DETAILED: LEFT DISTAL PRETIBIAL REGION
LOCATION DETAILED: LEFT LATERAL FOREHEAD
LOCATION DETAILED: RIGHT SUPERIOR UPPER BACK
LOCATION DETAILED: RIGHT DORSAL WRIST
LOCATION DETAILED: RIGHT INFERIOR MEDIAL UPPER BACK

## 2021-01-07 ASSESSMENT — SEVERITY ASSESSMENT: SEVERITY: ALMOST CLEAR

## 2021-01-07 ASSESSMENT — LOCATION ZONE DERM
LOCATION ZONE: LEG
LOCATION ZONE: ARM
LOCATION ZONE: TRUNK
LOCATION ZONE: FACE

## 2021-01-07 ASSESSMENT — LOCATION SIMPLE DESCRIPTION DERM
LOCATION SIMPLE: RIGHT WRIST
LOCATION SIMPLE: LEFT FOREHEAD
LOCATION SIMPLE: RIGHT UPPER BACK
LOCATION SIMPLE: RIGHT LOWER BACK
LOCATION SIMPLE: LEFT PRETIBIAL REGION
LOCATION SIMPLE: ABDOMEN
LOCATION SIMPLE: LEFT FOREARM
LOCATION SIMPLE: RIGHT FOREARM

## 2021-01-07 NOTE — PROCEDURE: OTHER
Detail Level: Detailed
Other (Free Text): Patient declines LN2 offered today.
Render Risk Assessment In Note?: no
Note Text (......Xxx Chief Complaint.): This diagnosis correlates with the

## 2021-01-07 NOTE — PROCEDURE: LIQUID NITROGEN
Duration Of Freeze Thaw-Cycle (Seconds): 5-10
Number Of Freeze-Thaw Cycles: 2 freeze-thaw cycles
Add 52 Modifier (Optional): no
Render Post-Care Instructions In Note?: yes
Medical Necessity Clause: This procedure was medically necessary because the lesions that were treated were:
Medical Necessity Information: It is in your best interest to select a reason for this procedure from the list below. All of these items fulfill various CMS LCD requirements except the new and changing color options.
Post-Care Instructions: I reviewed with the patient in detail post-care instructions. Patient is to wear sunprotection, and avoid picking at any of the treated lesions. Pt may apply Vaseline to crusted or scabbing areas.
Detail Level: Detailed
Consent: The patient's consent was obtained including but not limited to risks of crusting, scabbing, blistering, scarring, darker or lighter pigmentary change, recurrence, incomplete removal and infection.

## 2021-01-07 NOTE — PROCEDURE: PRESCRIPTION MEDICATION MANAGEMENT
Continue Regimen: triamcinolone acetonide 0.1 % topical cream BID Sig: Apply twice daily to rash up to 2 weeks/month as needed. Patient denies needing refills at this time.
Detail Level: Zone
Render In Strict Bullet Format?: No
Continue Regimen: Alcortin gel apply to affected area 3-4 times daily as needed, Patient declines needing refills at this time.

## 2021-06-10 ENCOUNTER — APPOINTMENT (RX ONLY)
Dept: URBAN - METROPOLITAN AREA CLINIC 349 | Facility: CLINIC | Age: 81
Setting detail: DERMATOLOGY
End: 2021-06-10

## 2021-06-10 DIAGNOSIS — D22 MELANOCYTIC NEVI: ICD-10-CM

## 2021-06-10 DIAGNOSIS — L81.4 OTHER MELANIN HYPERPIGMENTATION: ICD-10-CM

## 2021-06-10 DIAGNOSIS — L72.8 OTHER FOLLICULAR CYSTS OF THE SKIN AND SUBCUTANEOUS TISSUE: ICD-10-CM

## 2021-06-10 DIAGNOSIS — L30.9 DERMATITIS, UNSPECIFIED: ICD-10-CM

## 2021-06-10 DIAGNOSIS — Z85.828 PERSONAL HISTORY OF OTHER MALIGNANT NEOPLASM OF SKIN: ICD-10-CM

## 2021-06-10 DIAGNOSIS — L30.4 ERYTHEMA INTERTRIGO: ICD-10-CM | Status: WELL CONTROLLED

## 2021-06-10 DIAGNOSIS — L30.4 ERYTHEMA INTERTRIGO: ICD-10-CM

## 2021-06-10 PROBLEM — D48.5 NEOPLASM OF UNCERTAIN BEHAVIOR OF SKIN: Status: ACTIVE | Noted: 2021-06-10

## 2021-06-10 PROCEDURE — ? PRESCRIPTION MEDICATION MANAGEMENT

## 2021-06-10 PROCEDURE — ? MEDICAL PHOTOGRAPHY REVIEW

## 2021-06-10 PROCEDURE — ? COUNSELING

## 2021-06-10 PROCEDURE — ? OTHER

## 2021-06-10 PROCEDURE — ? PHOTO-DOCUMENTATION

## 2021-06-10 PROCEDURE — 99214 OFFICE O/P EST MOD 30 MIN: CPT

## 2021-06-10 ASSESSMENT — LOCATION SIMPLE DESCRIPTION DERM
LOCATION SIMPLE: ABDOMEN
LOCATION SIMPLE: POSTERIOR SCALP
LOCATION SIMPLE: LEFT PRETIBIAL REGION
LOCATION SIMPLE: RIGHT UPPER BACK
LOCATION SIMPLE: RIGHT FOREARM
LOCATION SIMPLE: RIGHT FOREARM
LOCATION SIMPLE: LEFT FOREARM
LOCATION SIMPLE: ABDOMEN
LOCATION SIMPLE: RIGHT UPPER BACK
LOCATION SIMPLE: LEFT FOREARM
LOCATION SIMPLE: RIGHT LOWER BACK

## 2021-06-10 ASSESSMENT — LOCATION ZONE DERM
LOCATION ZONE: ARM
LOCATION ZONE: TRUNK
LOCATION ZONE: SCALP
LOCATION ZONE: TRUNK
LOCATION ZONE: LEG
LOCATION ZONE: ARM

## 2021-06-10 ASSESSMENT — LOCATION DETAILED DESCRIPTION DERM
LOCATION DETAILED: LEFT LATERAL ABDOMEN
LOCATION DETAILED: RIGHT PROXIMAL DORSAL FOREARM
LOCATION DETAILED: RIGHT PROXIMAL DORSAL FOREARM
LOCATION DETAILED: RIGHT SUPERIOR UPPER BACK
LOCATION DETAILED: LEFT DISTAL PRETIBIAL REGION
LOCATION DETAILED: LEFT DISTAL DORSAL FOREARM
LOCATION DETAILED: RIGHT LATERAL ABDOMEN
LOCATION DETAILED: LEFT LATERAL ABDOMEN
LOCATION DETAILED: RIGHT POSTERIOR PARIETAL SCALP
LOCATION DETAILED: LEFT DISTAL DORSAL FOREARM
LOCATION DETAILED: LEFT PROXIMAL DORSAL FOREARM
LOCATION DETAILED: RIGHT LATERAL ABDOMEN
LOCATION DETAILED: LEFT PROXIMAL DORSAL FOREARM
LOCATION DETAILED: RIGHT SUPERIOR MEDIAL LOWER BACK
LOCATION DETAILED: RIGHT SUPERIOR UPPER BACK
LOCATION DETAILED: RIGHT INFERIOR MEDIAL UPPER BACK

## 2021-06-10 NOTE — PROCEDURE: OTHER
Note Text (......Xxx Chief Complaint.): This diagnosis correlates with the
Render Risk Assessment In Note?: yes
Other (Free Text): Patient states lesion is not painful. It has not been growing rapidly.
Detail Level: Detailed

## 2021-06-10 NOTE — PROCEDURE: PRESCRIPTION MEDICATION MANAGEMENT
Detail Level: Zone
Render In Strict Bullet Format?: No
Continue Regimen: Alcortin gel apply to affected area 3-4 times daily as needed

## 2021-06-10 NOTE — PROCEDURE: PRESCRIPTION MEDICATION MANAGEMENT
Continue Regimen: Alcortin gel apply to affected area 3-4 times daily as needed, Patient declines needing refills at this time.
Detail Level: Zone
Continue Regimen: triamcinolone acetonide 0.1 % topical cream BID Sig: Apply twice daily to rash up to 2 weeks/month as needed. Patient denies needing refills at this time.
Render In Strict Bullet Format?: No

## 2021-11-17 NOTE — HOSPICE
Patient is an [de-identified]year old female being admitted to [address] [place] [address] with a primary diagnosis of Metatstatic Breast Cancer. KPS/PPS 50. ECOG 3. Patient resides at home with her spouse/PCG, Dilia Bruce. Patient has two very supportive children, Luis Bryson and Qing Schwartz, who assist with patient care. Patient is alert and oriented x 4; signed [facility] consents and DNR. Patient was first diagnosed with Stage 2 L breast Cancer in January 2012. She received bilateral mastectomy in 2013 and continued adjuvant chemo and hormone therapy. In 2017, pleural biopsy performed confirming metastatic adenocarcinoma consistent with known breast carcinoma. Liver core needle biopsy performed 1/11/21 confirming metastatic breast ductal carcinoma. Patient started on Eribulin chemotherapy 9/2021; 5 doses completed with last dose given 11/3/2021. Restaging PET scan on 11/10/2021 confirmed worsening disease with pleural mets, increased mets to liver, L adrenal mets, and multiple bone mets. Patient has experienced increased pain r/t widespread mets to bone. Patient also experiencing nausea, decreased appetite, and worsening weakness/fatigue r/t disease progression. She is ambulatory short distances with use of walker. No recent falls reported. Patient is short of breath at rest and with minimal exertion. She is prescribed oxygen 2 liters per minute via NC continuously, which has been effective. Patient started on Oxy IR 5mg PO q4h PRN pain/SOB on hospice admission; to replace Norco. Patient to continued Zofran ODT 8mg at increased frequency of every 6 hours as needed for n/v. Lorazepam 1mg to be continued at increased frequency of every 4 hours as needed for anxiety, sleep, or n/v unrelieved by ativan. Patient is appropriate for hospice services and desires to remain comfortable in her own home surrounded by family.     RN educated patient and family (Shhaana Gupta, and Yadiel Sutton) on hospice process and philosophy, medication management, pain control, skin care and protection, fall precautions, anxiety strategies, home safety, and social distancing due to COVID-19. Pt history, assessment, and status reviewed with patient's hospice attending MD, Dr. Tammie Toney. Meds reconciled with NP, Monica Rivera. Patient and family made aware of volunteer services but pending at this time due to COVID-19; plans to reassess volunteer needs once volunteer services become available. Patient is appropriate for hospice services at this time. HHA requested 2 weekly for ADL support; requests for HHA services to begin week of 11/21/21. SW and SC services accpeted. Caregiver made aware that an RN will be completing a follow-up visit within 24 hours. Handwritten medication list, [facility] book and magnet with [facility] phone number left in home. RN educated Caregiver to call [facility] 24/7 phone line with any changes, concerns, or falls with verbalized understanding. DME: Transport chair, O2 concentrator and portable tanks to be delivered to patient home on 11/17/21 by [facility]. Patient current O2 concentrator and tanks will need to be picked up by existing DME company. Meds: Senna, lorazepam and omeprazole to be delivered this week by [facility]. Oxy IR to be faxed to [facility] on [address]  [facility] [address] on 11/17/21 per patient request.     Supplies: chux, incontinence liners, moisturizing cream and wipes ordered from [facility] on admission

## 2021-11-17 NOTE — HOSPICE
Greeted at door by son. Son and  present for visit. Upon entering residence observed patient sitting upright in recliner in living area. Oxygen in use 2LPM via NC. Alert and oriented x4. Heart rate regular, lungs clear, bowel sounds hypoactive, +1 pitting edema BLE, skin pale. Patient and family deny and acute changes since admission. Reports that her main problems are constipation and pain, and struggles to manage them well enough to be free from discomfort of these two at the same time. Reports last bowel movement was yesterday and that she has had 2 in the last 7 days. Educated patient regarding how decreased PO intake of food and fluids affect bowel movements and encouraged patient to increase PO fluids and add fiber to her diet along with prescribed bowel regimen. Demonstrates good understanding regarding constipation and ways to resolve. Patient reports pain, see pain section. Encouraged patient to take pain medication as needed for pain. Demonstrates good understanding regarding her pain and pain medications. Patient denies any other issues to address on this date. Instructed patient and family to contact this RN directly during business hours and office after hours with any acute changes or needs. Verbalizes understanding and in agreement with POC.

## 2021-11-23 NOTE — HOSPICE
Greeted at door by daughter. Daughter and  present for Dr Charity Bergeron visit. Upon entering residence observed patient sitting upright on couch. Alert and oriented x 4. Heart rate regular, lungs diminished BLL, bowel sounds hypoactive, and trace edema BLE. Patient denies any acute changes since last visit but reports that Oxy IR is not effective in controlling pain, Dr Maxim Rodrigues increased dose, see new orders. Also reports to Dr Maxim Rodrigues that she is constipated. Patient educated regarding bowel regimen, patient verbalizes understanding but education ongoing. Patient reports that she is having s/s of UTI, new order for Cipro. Dr Maixm Rodrigues educated patient regarding medications and indication, education ongoing. No other issues to address on this date. Instructed patient and family to contact this RN or office after hours with any acute changes or needs.

## 2021-11-23 NOTE — CASE COMMUNICATION
Pt stated she is able to shower in her safe shower at the moment that her  will assist her if needed. This HHA reached out to CM from what was told by pt. Pt also stated she has told several people that she did not yet need any assistance in showering  at the moment, passing all info on to CM.

## 2021-11-24 NOTE — HOSPICE
Initial SWA completed with pt, spouse Amauri Osorio, and son Naseem Constantino. Pt is an [de-identified] yr old female admitted with a diagnosis of breast cancer. Pt presents as aaox4 with calm and pleasant affect and is sitting on couch in living room. Pt reports she has a lot of issues with pain and has to sit a very specific way to support her back. Pt reports she has been prescribed a couple different kinds of pain medication, but has not yet found something helpful. SW confirmed MD and CM will visit [facility] tomorrow and SW encouraged pt to disucss all pain related concerns with them at that visit. Pt reports she is still able to be independent and does not require caregiving support from family at this time. Orin Walters is supportive, but has bladder cancer and is undergoing treatment that causes fatigue and weakness. Pt has a son and dgtr, grandchildren, and great grandchildren that she feels well supported by. Family has not yet discussed plan for pt care when she becomes too weak to care for herself, but pt and son agree family will \"rally\" and provide adequate support. Pt and family confirm understanding of available resources and pt given info for [employer] respite voucher. Pt reports she would also like ensure from the cancer society and SW will deliver next week. No other needs voiced at this time.

## 2021-11-30 NOTE — HOSPICE
Summoned into home by patient voice. Upon entering residence observed patient sitting upright on couch with papers and boxes surrounding her. Patient reports that she was paying bills. Alert and oriented x4 during visit but is more flat than bright this week. Verbalizes that she is worried about her  Fransisca Lu who has bladder cancer. Emotional support provided. Assessment completed. Heart rate regular but tachycardic, right lung diminished throughout, bowel sounds hypoactive, and +1 pitting edema to BLE. Patient reports that at rest pain has increased despite an increase in Oxycodone IR dose last week. Reports that she has not started taking prednisone daily yet, encouraged her to initiate and this RN would check back Friday for effectiveness. Verbalizes understanding and in agreement with POC. Denies any other issues to address on this date. This RN reported to daughter Tracy Skinner the topics discussed during visit. Instructed both patient and daughter to contact this RN or office after hours with any acute changes or needs.

## 2021-12-01 PROBLEM — I42.9 CARDIOMYOPATHY (HCC): Status: ACTIVE | Noted: 2021-01-01

## 2021-12-01 PROBLEM — C50.919 METASTATIC BREAST CANCER (HCC): Status: ACTIVE | Noted: 2021-01-01

## 2021-12-01 NOTE — PROGRESS NOTES
Problem: Falls - Risk of  Goal: *Absence of Falls  Description: Patient Mansoor Fang will remain free from falls AEB no injuries r/t falls each shift during inpatient hospice stay.         Note: Fall Risk Interventions:  Mobility Interventions: Bed/chair exit alarm, Patient to call before getting OOB     Medication Interventions: Bed/chair exit alarm, Evaluate medications/consider consulting pharmacy, Patient to call before getting OOB, Teach patient to arise slowly    Elimination Interventions: Bed/chair exit alarm, Call light in reach, Patient to call for help with toileting needs, Stay With Me (per policy)

## 2021-12-01 NOTE — HOSPICE
This RN was summoned to home this morning due to a fall at 0700, see fall tracking and care plan for details. Upon entering residence observed patient sitting in recliner. Alert and oriented x4 but lethargic on this date and having difficulty staying in the moment. Heart rate regular but tachycardic, right lung diminished, bowel sounds hypoactive, +1 pitting edema BLE, pedal pulses weak, and skin cool and pale. Educated patient and family regarding GIP care for symptom management and financial agreement signed. Transport set for 1230, report called to Devyn at [facility]. Patient and family deny questions or concerns regarding transfer. No other issues to address. Instructed family to contact office with any acute changes or needs prior to transport.

## 2021-12-01 NOTE — [facility] IDG SOCIAL WORKER NOTES
Patient: Mansoor Fang    Date: 12/01/21  Time: 2:37 PM    [facility]  Notes  SW has read the initial comprehensive assessment and plan of care. No immediate needs noted. Initial SW assessment visit will be completed within 5 days of admission.          Signed by: Caesar Bradford LMSW

## 2021-12-01 NOTE — PROGRESS NOTES
Problem: Falls - Risk of  Goal: *Absence of Falls  Description: Patient Td Khoury will remain free from falls AEB no injuries r/t falls each shift during inpatient hospice stay.         Note: Fall Risk Interventions:  Mobility Interventions: Bed/chair exit alarm, Patient to call before getting OOB     Medication Interventions: Bed/chair exit alarm, Evaluate medications/consider consulting pharmacy, Patient to call before getting OOB, Teach patient to arise slowly    Elimination Interventions: Bed/chair exit alarm, Call light in reach, Patient to call for help with toileting needs, Stay With Me (per policy)

## 2021-12-01 NOTE — [facility] IDG NURSE NOTES
Patient: Sameera Mems    Date: 12/01/21  Time: 2:11 PM    [facility] Nurse Notes  Pt is an [de-identified]year old female who is on [facility] program that was admitted to [facility] today [facility] level of care with hospice dx of Breast cancer. Pt was transferred via [facility] and was transferred from stretcher to bed with maximum assistance. Pt is alert and oriented x3. Pt rates her pain on a 1-10 scale as a '2' since \"the ride over was bumpy. \" Pt has clear lung sounds, irregular heart sounds, absent bowel sounds, positive radial and pedal pulses. Pt is pale in color with bright red cheeks. Pt states last night that she was sitting in a recliner chair, got up to cover her  with a blanket and attempted to sit on the couch and missed the cushion and landed on the floor between the couch and the coffee table. Pt is free of cuts or bruises and claims she did not hit her head. Pt's daughter is at the bedside once initial assessment was completed. NP at the bedside at time of admit. Pt has a reddened area inside her left buttock  that is not opened at this time. Pt is wearing a pull up. BSC has been ordered and pt and daughter have stated they do not want the patient to have a key catheter at this time and that she can pivot safely to [facility]. Staff will evaluate safety of use of BSC vs. Key catheter. Admission complete and initial general hopsice care plan initiated which includes spiritual, psychosocial and bereavement. IDG team and MD made aware of plan of care and immediate needs. Signed by: Francois Mcknight     7354 Pt assisted to [facility] to urinate. Pt did well pivoting but is not strong enough to hold herself up without assistance. Pt voided and assisted back to bed and positioned for comfort. 1459 Pt's Fentanyl patch 12mcg applied to her left chest. And scheduled Cipro and Prednisone given per orders and pt took without difficulty. Grilled Cheese and soup brought to the patient per her request. Safety measures in place. Door remains open for continued monitoring. 1 Pt assisted to [facility] where she voiced clear yellow urine. Pt not strong on her feet and needs staff assistance to pivot to [facility]. Pt assisted back to bed and repositioned for comfort. Dinner tray set up where she is self feeding. Safety measures in place. Door remains open for continued monitoring. 1757 pt medicated with Roxicodone 10mg PO for pain in her back rated as a '5' on a 1-10 scale. Report given to oncoming RN.

## 2021-12-02 NOTE — PROGRESS NOTES
1845:  Patient report from Maggie Grijalva RN. Patient ID by name and . Patient is GIP LOC for hospice diagnosis of breast cancer to manage symptoms of pain, dyspnea, and agitation. Discharge plan is for patient to remain in [facility] until demise. Discharge plans to be evaluated for potential LOC change. RN providing total care of patient this shift. Patient sitting in bed visiting with family and denies pain or needs at this time. Bed low and locked and all safety measures in place. Call bell in reach. 1900:  Family has left at this time and nurse assisted patient to bathroom. 2019:  Patient called for assistance to bathroom. Nurse assisted patient to bathroom. BP checked and all HS medications given as ordered. PRN Lorazepam 0.5 mg PO given per patient request as she takes it at night. Assessment complete. Lights dimmed and call bell in reach. All safety measures in place. 2308:  Patient called and stated she was having pain on her right side. Medicated with PRN oxycodone for pain. Will reassess. Call light in reach and all safety measures in place. 0000:  Reassessment:  Patient now sleeping with no s/sx of pain observed. 0200:  Patient resting with eyes closed. No s/sx of pain, dyspnea, or agitation observed. FLACC 0/10.    0400:  Patient resting with eyes closed. No s/sx of pain, dyspnea, or agitation observed. FLACC 0/10.    8441:  Assisted patient to bathroom. Patient requesting pain medicine for pain and Lorazepam as well. Medicated with PRN Oxycodone 10 mg PO for pain and PRN Lorazepam 0.5 mg PO as requested. Will reassess. VS obtained. 4937:  Reassessment:  Patient resting with eyes closed. No s/sx of pain or anxiety observed. All safety measures remain in place and call bell in reach. Patient has received two doses of Oxycodone for pain and two doses of Lorazepam for anxiety this shift. Report to Maggie Grijalva RN.

## 2021-12-02 NOTE — PROGRESS NOTES
Problem: Falls - Risk of  Goal: *Absence of Falls  Description: Kristen Shore will remain free from falls AEB no injuries r/t falls each shift during inpatient hospice stay. Outcome: Progressing Towards Goal  Note: Fall Risk Interventions:  Mobility Interventions: Bed/chair exit alarm, Patient to call before getting OOB         Medication Interventions: Bed/chair exit alarm, Evaluate medications/consider consulting pharmacy, Patient to call before getting OOB, Teach patient to arise slowly    Elimination Interventions: Bed/chair exit alarm, Call light in reach, Patient to call for help with toileting needs, Stay With Me (per policy)              Problem: Pain  Goal: *Control of Pain  Description: Kristen Shore will exhibit decrease in pain AEB rating pain less than 3 on 1-10 scale or 3 FLACC score within each shift of receiving pain medication during inpatient hospice stay. Outcome: Progressing Towards Goal     Problem: Dyspnea Due to End of Life  Goal: Demonstrate understanding of and ability to manage respiratory symptoms at end of life  Description: Kristen Shore will indicate effective breathing pattern AEB absence of respiratory distress each shift during inpatient hospice stay. Outcome: Progressing Towards Goal     Problem: Anxiety/Agitation  Goal: Verbalize or staff assess the ability to manage anxiety  Description: The patient/family/caregiver will verbalize and demonstrate ability to manage the patient's anxiety throughout hospice care. Outcome: Progressing Towards Goal  Note:   Kristen Shore will demonstrate appropriate motor behavior AEB less than 2 episodes of fidgety, picking or pulling at clothes on devices, yelling out, getting out of bed, etc. each shift during inpatient hospice stay.       Problem: Pressure Injury - Risk of  Goal: *Prevention of pressure injury  Description: Kristen Shore will remain free from acquired pressure injuries AEB zero acquired pressure injuries during assessment of skin each shift during inpatient hospice stay.     Outcome: Progressing Towards Goal  Note: Pressure Injury Interventions:  Sensory Interventions: Avoid rigorous massage over bony prominences, Check visual cues for pain, Float heels, Keep linens dry and wrinkle-free, Monitor skin under medical devices, Pad between skin to skin, Pressure redistribution bed/mattress (bed type)    Moisture Interventions: Absorbent underpads, Apply protective barrier, creams and emollients, Limit adult briefs, Moisture barrier    Activity Interventions: Pressure redistribution bed/mattress(bed type)    Mobility Interventions: Float heels, Pressure redistribution bed/mattress (bed type)    Nutrition Interventions: Document food/fluid/supplement intake, Offer support with meals,snacks and hydration    Friction and Shear Interventions: Apply protective barrier, creams and emollients, Lift team/patient mobility team

## 2021-12-02 NOTE — H&P
History and Physical    Patient: Bulmaro Erickson MRN: [id number]  SSN: [id number]    YOB: 1940  Age: [de-identified] y.o. Sex: female      Subjective:      Bulmaro Erickson is a [de-identified] y.o. female who has a hospice diagnosis of metastatic breast cancer. Hospice associated diagnoses liver metastases, lung metastases, left adrenal metastases, bone metastases, chronic pain syndrome, S/P bilateral mastectomy and chemotherapy, hypoalbuminemia, anemia, dyspnea, oxygen dependence. Non-associated diagnoses are mitral valve insufficiency, essential hypertension, history of congenital atrial septal defect S/P repair. She was diagnosed with left sided breast cancer in 2013 with liver, lung and pleural metastases. She was treated with bilateral mastectomy and chemotherapy at the time of diagnosis. She was found to have disease progression with metastasis to the left pleural space in 2017. She has taken 6 different chemotherapy regimens as well as immunotherapy with steady progression of disease. She decided to stop aggressive measures and pursue hospice care last month. She is now a patient in our in home hospice program. She sustained a fall at her home this morning when she was trying to help put a blanket over her . She was trying to sit down and missed the sofa, hitting the floor. She did not hit her head and is not on any anticoagulants. She is being admitted due to pain uncontrolled by her normal pain medication regimen and for management of anxiety.          Past Medical History:   Diagnosis Date    Abnormal EKG 1/21/2016    Anxiety     AR (allergic rhinitis) 8/18/2012    Atrial septal defect 1/21/2016    BBB (bundle branch block) 7/22/2010    Body mass index 33.0-33.9, adult     CA - breast cancer 2012    left with bilateral mastectomy    Cancer ([facility] 1976    squamous cell cancer perineum/NO radiation, chemo    Cardiomyopathy     NON ISCHEMIC, EF WNL  (66%) on Nov. 2010.   Normal coronaries at cath 2006 with depressed EF at that time, etiology uncertain    Cardiomyopathy, dilated, nonischemic ([address] 8/18/2012    Chronic pain     right side abdomen & back    Chronic pain syndrome 1/24/2018    CHRONIC UTI     DJD (degenerative joint disease) 8/18/2012    Foramen ovale     GERD (gastroesophageal reflux disease)     controlled with medication    HLD (hyperlipidemia) 8/18/2012    Hyperlipidemia     Hypertension     controlled with medication    Hypothyroidism     controlled with medication    IBS (irritable bowel syndrome)     LBBB (left bundle branch block)     Lymphedema of limb     left arm    Mitral valve insufficiency 8/18/2012    Mitral valve regurgitation     MILD.   None noted on echo Nov. 2010    osteoarthritis     Psychiatric disorder     anxiety    Recurrent UTI     Dr. Allison Ruiz Renal insufficiency 7/22/2010    S/P left  total knee replacement using cement 11/4/2011    Shortness of breath dyspnea 1/21/2016    Unspecified adverse effect of anesthesia     No BP or Venipuncture left arm     Past Surgical History:   Procedure Laterality Date    CARDIAC CATHETERIZATION  7/20/06    SEE NOTES    HX APPENDECTOMY  1958    HX BILATERAL SALPINGO-OOPHORECTOMY  2015    Dr Aman Paulino HX CATARACT REMOVAL  2005    left with IOL implant    HX CATARACT REMOVAL  Jan. 2011    right with IOL implant    HX GYN  1996    cystocele repair    HX HYSTERECTOMY  1976    PARTIAL    HX KNEE ARTHROSCOPY Right 2011    HX KNEE REPLACEMENT  2010    right/ Dr. Bang Godinez  2012    double Dr Moon Vargas Right 12/19/2008    HX ORTHOPAEDIC Right July 2010    partial  knee replacement    HX OTHER SURGICAL  1988    squamous cell removed from perineum    HX VASCULAR ACCESS  2012    life port  & removed 2014    [place] KNEE [address]  9/26/2011    @ [address]/Dr. Fatuma Otto    UT TOTAL KNEE ARTHROPLASTY Left 2011      Family History   Problem Relation Age of Onset    Heart Disease Father         MI    Heart Attack Father     Hypertension Father     High Cholesterol Father     Anemia Mother         SEVERE ANEMIA WITH MI      Heart Disease Mother     Hypertension Brother     Malignant Hyperthermia Neg Hx     Delayed Awakening Neg Hx     Pseudocholinesterase Deficiency Neg Hx     Post-op Nausea/Vomiting Neg Hx     Emergence Delirium Neg Hx     Post-op Cognitive Dysfunction Neg Hx     Other Neg Hx      Social History     Tobacco Use    Smoking status: Never Smoker    Smokeless tobacco: Never Used   Substance Use Topics    Alcohol use: No      Prior to Admission medications    Medication Sig Start Date End Date Taking? Authorizing Provider   oxyCODONE IR (ROXICODONE) 10 mg tab immediate release tablet Take 10 mg by mouth every three (3) hours as needed for Pain. Take one tablet every 3 hours as needed for pain. 11/23/21  Yes Brice Arroyo MD   ciprofloxacin HCl (CIPRO) 250 mg tablet Take 250 mg by mouth two (2) times a day. Take one tablet twice daily for 5 days for suspected UTI. 11/23/21  Yes Brice Arroyo MD   D-MANNOSE, BULK, Take 500 mg by mouth two (2) times a day. Take one tablet twice daily for urinary tract health 11/23/21  Yes Brice Arroyo MD   LORazepam (Ativan) 1 mg tablet Take 1 Tablet by mouth every four (4) hours as needed (anxiety, sleep, and/or nausea/vomiting unrelieved by zofran). Max Daily Amount: 2 mg. Administer one tablet (=1mg) by mouth every 4 hours as needed for anxiety, sleep, and/or nausea/vomiting unrelieved by zofran   Indications: anxiety, insomnia, and nausea/vomiting 11/17/21  Yes Nata Alicia NP   predniSONE (DELTASONE) 10 mg tablet Take 10 mg by mouth daily (with breakfast). Indications: cancer related pain 11/17/21  Yes Nata Alicia NP   ondansetron (ZOFRAN ODT) 8 mg disintegrating tablet Take 8 mg by mouth every six (6) hours as needed for Nausea or Vomiting.  Administer one tablet (=8mg) by mouth every 6 hours as needed for nausea and/or vomiting  Indications: nausea/vomiting 11/16/21  Yes Cassie Christianson NP   senna (Senna) 8.6 mg tablet Take 1 Tablet by mouth two (2) times daily as needed for Constipation. Administer one tablet (=8.6mg) by mouth two times daily as needed for constipation   Indications: constipation 11/16/21  Yes Cassie Christianson NP   oxygen-air delivery systems ([address]) 2 L/min by IntraNASal route continuous. Administer oxygen at 2 Liters/Minute inhaled via nasal cannula continuously for shortness of breath  Indications: dyspnea 11/16/21  Yes Cassie Christianson NP   omeprazole (PRILOSEC) 40 mg capsule Take 40 mg by mouth two (2) times a day. Indications: gastroesophageal reflux disease 11/16/21  Yes Cassie Christianson NP   DULoxetine (CYMBALTA) 30 mg capsule TAKE ONE CAPSULE BY MOUTH EVERY DAY  Patient taking differently: TAKE ONE CAPSULE BY MOUTH EVERY DAY  Indications: osteoarthritis 12/13/19  Yes Myron Hsu MD   carvedilol (COREG) 6.25 mg tablet Take 1 Tab by mouth two (2) times daily (with meals). Indications: \"FOR MITRAL VALVE REGURGITATION\" 10/30/19  Yes Trisha Castaneda MD   levothyroxine (SYNTHROID) 88 mcg tablet Take 1 Tab by mouth Daily (before breakfast). Indications: a condition with low thyroid hormone levels 10/28/19  Yes Myron Hsu MD   ramipril (ALTACE) 5 mg capsule Take 1 Cap by mouth daily. Patient taking differently: Take 1 Capsule by mouth daily. Indications: high blood pressure 11/1/18  Yes Trisha Castaneda MD        Allergies   Allergen Reactions    Bactrim [Sulfamethoprim] Rash       Review of Systems:  Denies nausea. C/o dyspnea at rest. C/o back pain 6/10, patient has bone metastases to the spine.      Objective:     Vitals:    12/01/21 1314 12/01/21 2019   BP: 138/78 139/70   Pulse: (!) 120 (!) 108   Resp: 18         Physical Exam:  GENERAL: alert, fatigued, cooperative, moderate distress, appears stated age, pale  LUNG: Clear diminished breath sounds with labored respirations. Dyspnea at rest.   HEART: regular rate and rhythm  ABDOMEN: soft, non-tender, distended. Ascites. Bowel sounds active. : Continent. EXTREMITIES:  extremities with no cyanosis or edema. + pulses. SKIN: Pale. Warm to touch. Skin intact. NEUROLOGIC: Alert and oriented x 3. Generalized weakness. Able to follow commands and answer questions.    PSYCHIATRIC: anxious    Assessment:     Hospital Problems  Date Reviewed: 12/1/2021          Codes Class Noted POA    Cardiomyopathy ([facility] ICD-10-CM: I42.9  ICD-9-CM: 425.4  12/1/2021 Unknown        Metastatic breast cancer ([facility] ICD-10-CM: C50.919  ICD-9-CM: 174.9  12/1/2021 Unknown        * (Principal) Cancer of breast ([facility] ICD-10-CM: C50.919  ICD-9-CM: 174.9  3/20/2013 Yes              Plan:     Current Facility-Administered Medications   Medication Dose Route Frequency    sodium chloride (NS) flush 3 mL  3 mL IntraVENous PRN    acetaminophen (TYLENOL) tablet 650 mg  650 mg Oral Q4H PRN    acetaminophen (TYLENOL) suppository 650 mg  650 mg Rectal Q3H PRN    haloperidol lactate (HALDOL) injection 2 mg  2 mg SubCUTAneous Q1H PRN    Or    haloperidol lactate (HALDOL) injection 2 mg  2 mg IntraVENous Q1H PRN    hyoscyamine SL (LEVSIN/SL) tablet 0.125 mg  0.125 mg SubLINGual Q4H PRN    glycopyrrolate (ROBINUL) injection 0.2 mg  0.2 mg SubCUTAneous Q4H PRN    LORazepam (ATIVAN) injection 2 mg  2 mg IntraVENous Q10MIN PRN    fentaNYL (DURAGESIC) 12 mcg/hr patch 1 Patch  1 Patch TransDERmal Q72H    morphine injection 1 mg  1 mg IntraVENous Q20MIN PRN    Or    morphine injection 1 mg  1 mg SubCUTAneous Q20MIN PRN    oxyCODONE IR (ROXICODONE) tablet 10 mg  10 mg Oral Q3H PRN    predniSONE (DELTASONE) tablet 10 mg  10 mg Oral DAILY WITH LUNCH    [START ON 12/2/2021] pantoprazole (PROTONIX) tablet 40 mg  40 mg Oral ACL    carvediloL (COREG) tablet 6.25 mg  6.25 mg Oral BID    senna (SENOKOT) tablet 8.6 mg  1 Tablet Oral BID    LORazepam (ATIVAN) tablet 0.5 mg  0.5 mg Oral Q4H PRN    ciprofloxacin HCl (CIPRO) tablet 250 mg  250 mg Oral BID       12/1: (Sera Carter pt-Rachel RN) Admitted [facility]IP with metastatic breast cancer for management of pain, dyspnea and anxiety/agitation. 1. Pain: Oxycodone 10mg po Q3 prn. Morphine 1mg IV/SQ Q20 minutes as needed if oxycodone ineffective. Prednisone 10mg po daily. Place Fentanyl 12mcg patch. 2. Dyspnea: Oxycodone 10mg po Q3 prn. Morphine 1mg IV/SQ Q20 minutes as needed if oxycodone ineffective. Levsin 0.125mg po Q4 prn or Glycopyrrolate 0.2mg q4 prn secretions. Oxygen at 3 L/min prn.    3. Anxiety/agitation:  Lorazepam 0.5mg PO q4 hours prn. Haloperidol 2mg IV/SQ Q1 hour prn if ativan ineffective. 4. Family/Pt Support: Family at bedside during exam. Medications and plan of care discussed with nursing staff and family. Will continue to monitor for symptoms and adjust medications as needed to maintain patient comfort. PPS 30%. Case discussed with Dr. Aracelis Austin.         Signed By: Abdelrahman Thrasher NP     December 1, 2021

## 2021-12-02 NOTE — PROGRESS NOTES
6758 Report received from off going RN. Pt was admitted on on 12/1 [facility] level of care with hospice dx of breast cancer and symptom management of pain and dyspnea. Pt received two doses of Ativan and two doses of Oxycodone overnight. Currently pt is resting in bed watching TV. No signs or symptoms of pain or dyspnea. Pt denies pain at this time. Safety measures in place with bed low and locked, call bell in reach, side rails up x2. Pt is alert and oriented and knows to use her call bell for any needs. Fentanyl patch in place her left chest.  Door remains open for continued monitoring. Plan of care reviewed with CNA. D/C plan- pt will remain at [facility] until her passing, ongoing assessment will determine appropriate level of care. 0710 Pt used bell for bathroom assistance. Pt was able to void without difficulty and was assisted back to bed and positioned for comfort. Ice water provided. Allevyn placed on left elbow since pt states skin is tender and the site is reddened, no open skin. Safety measures in place. Door remains open for continued monitoring. 0930 Pt's home RN, Lili Wilkerson at the bedside to see pt. Daughter arrived at the bedside. Pt denies needs at this time. Safety measures in place. 1133 Scheduled meds given per orders and pt took without difficulty. Pt repositioned for comfort and lunch tray set up, pt self feeding. Safety measures in place. Door remains open for continued monitoring. 1250 Pt resting in bed, watching TV. Pt denies needs at this time. Safety measures in place. Door remains open for continued monitoring. 0220 Pt resting with visitors at the bedside. Pt denies needs at this time. Safety measures in place. 1625 Pt resting in bed without needs at this time. TV is on, safety measures in place. Door remains open for continued monitoring.      1707 PRN dose of  Ativan 0.5mg PO given per pt request as she is feeling anxious and tearful,  and Roxicodone 10mg PO given for pain in pt's back. Pt repositioned for comfort. Safety measures in place. Door remains open for continued monitoring. 1750 Pt resting in bed, eyes open at times and states that pain medication has helped her back pain. Safety measures in place. Door remains open for continued monitoring. 200 Dr. Chano Ashford notified for pt request to take home Omeprazole 40 mg for increased gastric reflux. Verbal order received, pharmacy notified and order put in. Report given to oncoming RN.

## 2021-12-02 NOTE — PROGRESS NOTES
Progress Note    Patient: Abril Dempsey MRN: [id number]  SSN: [id number]    YOB: 1940  Age: [de-identified] y.o. Sex: female      Admit Date: 12/1/2021    LOS: 1 day     Clinical Summary:     Sujey Tubbs feels much better today with much less pain. She has concerns about not taking her aspirin and other changes in her medications. Examination reveals no obvious areas of tenderness, clear lungs, no abdominal tenderness and no lateralizing neurologic abnormalities. Vitals:    12/01/21 1314 12/01/21 2019 12/02/21 0543   BP: 138/78 139/70 (!) 156/76   Pulse: (!) 120 (!) 108 (!) 103   Resp: 18  16   Temp:   98.3 °F (36.8 °C)            Clinical Assessment:     Principal Problem:    Cancer of breast ([facility] (3/20/2013)    Active Problems:    Card[facility] (12/1/2021)      Metastatic breast cancer ([facility] (12/1/2021)        Treatment Plan:      I have reviewed the patient's Plan of Care with the nursing staff. Both she and her daughter and nursing staff all acknowledge that she looks and feels much better today. She is quite fixated on continuing a lot of her previous scheduled medications many of which were serving little of any purpose in the hospice context and representing a significant pill burden. We discussed this and I indicated that at the very least that the patient and her caregivers would have to agree on what medication she was not taken to have to cooperate by taking them. We will review her status at [facility] tomorrow and hopefully have her ready to go by the beginning of the week.           Current Facility-Administered Medications   Medication Dose Route Frequency    dexAMETHasone (DECADRON) tablet 2 mg  2 mg Oral DAILY    sodium chloride (NS) flush 3 mL  3 mL IntraVENous PRN    acetaminophen (TYLENOL) tablet 650 mg  650 mg Oral Q4H PRN    acetaminophen (TYLENOL) suppository 650 mg  650 mg Rectal Q3H PRN    haloperidol lactate (HALDOL) injection 2 mg  2 mg SubCUTAneous Q1H PRN    Or    haloperidol lactate (HALDOL) injection 2 mg  2 mg IntraVENous Q1H PRN    hyoscyamine SL (LEVSIN/SL) tablet 0.125 mg  0.125 mg SubLINGual Q4H PRN    glycopyrrolate (ROBINUL) injection 0.2 mg  0.2 mg SubCUTAneous Q4H PRN    LORazepam (ATIVAN) injection 2 mg  2 mg IntraVENous Q10MIN PRN    fentaNYL (DURAGESIC) 12 mcg/hr patch 1 Patch  1 Patch TransDERmal Q72H    morphine injection 1 mg  1 mg IntraVENous Q20MIN PRN    Or    morphine injection 1 mg  1 mg SubCUTAneous Q20MIN PRN    oxyCODONE IR (ROXICODONE) tablet 10 mg  10 mg Oral Q3H PRN    carvediloL (COREG) tablet 6.25 mg  6.25 mg Oral BID    senna (SENOKOT) tablet 8.6 mg  1 Tablet Oral BID    LORazepam (ATIVAN) tablet 0.5 mg  0.5 mg Oral Q4H PRN    ciprofloxacin HCl (CIPRO) tablet 250 mg  250 mg Oral BID           Signed By: Saranya Futlon MD     December 2, 2021

## 2021-12-02 NOTE — HSP[facility] IDG CHAPLAIN NOTES
Patient: Oris Moder    Date: 12/02/21  Time: 4:39 PM    [facility]  Notes    / Grief Counselor has reviewed  Initial Comprehensive Assessment and plan of care. Bereavement and Spiritual Care Assessments to be completed and plan of care put in place to meet patient and family needs.          Signed by: Leda Morales

## 2021-12-03 NOTE — HSPC IDG CHAPLAIN NOTES
Patient: Wilfrido Renteria    Date: 12/03/21  Time: 11:31 AM    [facility]  Notes    Assessments pending for spiritual and bereavement care.          Signed by: Danita Estes

## 2021-12-03 NOTE — PROGRESS NOTES
Problem: Emotional Support Needs  Goal: Patient/family is receiving emotional support  Description: Pt, Shanelle Ospina, and family will receive emotional support from SW weekly through weekly check-ins, validation of feelings, active listening, rapport building, and education on the hospice philosophy throughout pt's hospice journey.    Outcome: Progressing Towards Goal

## 2021-12-03 NOTE — PROGRESS NOTES
Background: Florinda Lindsay is an [de-identified]year old lady who comes to us from [facility] with a diagnosis of Breast Cancer ( according to the chart). She is supported by her , Maximiliano Tran. He has health issues as well. He has bladder Cancer. He is [place]. [place] has [place] 49 years. He worked 34 years full time with [employer]. Cortney Corley worked there 20 years. They have been  17 years. She has two children from a previous relationship,   Arturo Zafar and Orysia Mortimer. 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 Both children are supportive. She has 3 grandchildren and four great grandchildren. Cortney Corley and her  are Christians. Their Gema [employer] is [facility]. They are members of [facility], [address] [place] [address], [address] [place], [place] [address]. The e-mail  is [email or web address]. The phone number is [phone]. Assessment:   Cortney Corley reports being nauseated today. Her  and brother were in the room with her. She said it had been a busy day and she is quite tired. Her face appeared red. She had a wash cloth in her hand patting her face. She introduced her family. She wrote 's name on a tablet.  acknowledged the difficult day and offered encouragement.  offered a blessing for a peaceful and pain free night and a refreshed feeling when she awakes in the morning.  offered assurance of prayer. Plan:  to provide spiritual and emotional support throughout patient's time with [facility]. Focus on Anticipatory Grief. Goal: GOAL: Cortney Corley, her  Jaleel Joseph and children Arturo Zafar and Orysia Mortimer will demonstrate appropriate anticipatory grief reactions related to Faibola Flood impending death as evidenced by   their ability to verbalize feelings associated with grief such as denial, bargaining, anger, and depression. They will display feelings and associated behaviors in a healthy manner during inpatient hospice stay.

## 2021-12-03 NOTE — PROGRESS NOTES
Demographics      Information provided by: Pt's daughter Tracy Skinner. Name:   Ana Du of Care  GIP [] Routine  [x] Respite   []           From the in home program Yes [x] No []                                                        Diagnosis: Breast Cancer         Insurance    Medicare [x]   Medicaid  []  Blue Cross  []      Other []                Social    [x]   Single []     []    []     Children: Two           Community Resources Used in the Home prior to admission: Yes []  No [x]    [employer] Needed? Yes []  No [x]    If yes, explain:         Financial Concerns: There are no reported financial concerns at this time. Ze[place] Application Needed   Yes []  No [x]     Medicaid application needed Yes []  No [x]                                    IFA Form Complete  Yes [x]   No []     Discharge Plans: Plans are to remain [facility] for routine and comfort care and the plan is for pt to discharge on Monday. Work History :  Retired [x] [employer] [] Part-time [] Disabled []           [address]   Yes []  No [x]  [employer] []   [employer] [] [employer] [] [facility] []  [employer] []  [employer] []  Linked to [facility]   Yes []  No []  Referral made to [employer] Yes [] No [x]         Advanced Directives Scanned in the system      Living Will  Yes  []  No [x]   HCPOA     Yes  []  No [x]   DPOA        Yes  []  No [x]  DNR           Yes [x]  No []         Spiritual / Shaina Gutierrez: Mandaeism.         Final Arrangements: Unknown at this time. Medical History and/or Narrative copied from the patients chart from the [address] [place] [address] Physician or Rn:  [address] [place] [address] Nurse Notes-  Pt is an [de-identified]year old female who is on [facility] program that was admitted to [facility] today GIP level of care with hospice dx of Breast cancer.  Pt was transferred via [facility] ED and was transferred from stretcher to bed with maximum assistance. Pt is alert and oriented x3. Pt rates her pain on a 1-10 scale as a '2' since \"the ride over was bumpy. \" Pt has clear lung sounds, irregular heart sounds, absent bowel sounds, positive radial and pedal pulses. Pt is pale in color with bright red cheeks. Pt states last night that she was sitting in a recliner chair, got up to cover her  with a blanket and attempted to sit on the couch and missed the cushion and landed on the floor between the couch and the coffee table. Pt is free of cuts or bruises and claims she did not hit her head. Pt's daughter is at the bedside once initial assessment was completed. NP at the bedside at time of admit. Pt has a reddened area inside her left buttock  that is not opened at this time. Pt is wearing a pull up. BSC has been ordered and pt and daughter have stated they do not want the patient to have a key catheter at this time and that she can pivot safely to [facility]. Staff will evaluate safety of use of BSC vs. Key catheter. Admission complete and initial general hopsice care plan initiated which includes spiritual, psychosocial and bereavement. IDG team and MD made aware of plan of care and immediate needs. Mental Health History: No reported mental health history. Carol Castillo reports that pt needs to have control over her care and medications and has trouble letting others take control and advise her on proper medications for her needs. Volunteer discussion: Yes []    No [x]  **Due to COVID-19 protocols. Goals of care for the patient and family: To keep pt comfortable and to meet pt and family needs. Coping and Bereavement:  Carol Castillo discussed all that she has on her plate right now. She is a nurse and understands pt's illness and the progression. She expressed more concern about pt abiding by suggested medications and doses when she returns home.  Carol Castillo asked if she can see a MAR before pt returns home so Carol Echevarriaar can begin to discuss this with pt and create a weekly pill box so pt does not argue about the medications. BRANDON reported that if a [facility] already told Tasha Mckeon she cannot have the [facility] until discharge that this may be a regulation and BRANDON will double check with staff as BRANDON is unsure at this time. Was a Referral made to Bereavement  Yes [] No  [x]    Support Needs: Tashasilva Mckeon is supported by her brother and family. There are no support needs at this time. BRANDON will follow up with Tasha Mckeon on Monday about discharge and needs.

## 2021-12-03 NOTE — PROGRESS NOTES
Problem: Anticipatory Grief    GOAL: Artemio Bhakta, her  Danuta Jackson and children Mirella Drake and Edwin Ivy will demonstrate appropriate anticipatory grief reactions related to Vida's  impending death as evidenced by   their ability to verbalize feelings associated with grief such as denial, bargaining, anger, and depression. They will display feelings and associated behaviors in a healthy manner during inpatient hospice stay. Intervention:  will assess grief reactions with each encounter    Intervention:   will provide a safe space for open nonjudgmental communication.  will provide anticipatory grief education.      Outcome:  Progressing Towards Goal

## 2021-12-03 NOTE — PROGRESS NOTES
1900 Walking rounds completed with Abril Breen RN. Pt identified by name and . Pt admitted under [facility] on 2021 with a terminal diagnosis of breast cancer. Pt being managed for pain and dyspnea. Pt alert and oriented; pt very talkative and friendly. Pt requires some assistance with ADLs and ambulates with assistance. Continent of b/b. Pt resting quietly watching TV; no pain or agitation noted. RR non labored. No signs of NVD or SOB noted. FLACC 0/10. Pt is at [facility] level of care, no change to pt discharge plan. Pt to stay [facility] passing. Plan of care reviewed with CNA. 2100 Pt given scheduled medication as per MD orders. Pt given PRN Oxycodone and Ativan to manage pain and agitation. RR non labored. No signs of NVD or SOB noted. FLACC 6/10. Will continue to monitor for changes. 214 Nurse alerted to patient's room to assist with remote control and pt given TV list. Pt very talkative. Pt denies pain or discomfort. Will continue to monitor. 2233 Nurse alerted to patient's room to assist with water and throw away old soft drinks. Pt denies pain or discomfort. Will continue to monitor. 0014 Pt complained that she could not sleep due to anxiety; pt given PRN Lorazepam to manage symptoms. RR even and non labored. No signs of NVD or SOB. FLACC 0/10. No current needs noted. Will continue to monitor. 0042 Pt resting comfortably in bed; no signs of pain or distress noted. RR even and non labored. No signs of NVD or SOB. FLACC 0/10. Will continue to monitor. 0208 Pt complained of back and given PRN Morphine to manage pain. No other complaints noted. RR even and non labored. No signs of NVD or SOB. FLACC 0/10. Will continue to monitor. 0414 Pt resting comfortably in bed; no signs of pain or distress noted. RR even and non labored. No signs of NVD or SOB. FLACC 0/10. Will continue to monitor. 7213 Pt resting comfortably in bed; no signs of pain or distress noted.  RR even and non labored. No signs of NVD or SOB. FLACC 0/10. Will continue to monitor. Walking rounds completed with Sarah Berrios RN.

## 2021-12-03 NOTE — [facility] IDG MASTER NOTE
[facility]  Date: 12/03/21  Time: 4:06 PM    ___________________    Patient: Anderson Mendoza  Coverage Information:     Payor: [facility] MEDICARE     Plan: [facility] MEDICARE PART A AND B     Subscriber ID: [id number]     Phone Number:   MRN: [id number]    Current Benefit Period: Benefit Period 1  Start Date: 11/16/2021  End Date: 2/13/2022        Hospice Attending Provider: Amy Schneider [address] [place] [address]  [id number]  Phone: [phone]  Fax: [phone]    Level of Care: Routine Home Care      ___________________    Diagnoses: There were no encounter diagnoses.     Current Medications:    Current Facility-Administered Medications:     LORazepam (ATIVAN) tablet 1 mg, 1 mg, Oral, Q4H, Jadon Zuñiga MD    dexAMETHasone (DECADRON) tablet 2 mg, 2 mg, Oral, DAILY, Jadon Zuñiga MD, 2 mg at 12/03/21 1749    omeprazole (PRILOSEC) capsule 40 mg (Patient Supplied), 40 mg, Oral, BID, Bonny Piedra MD, 40 mg at 12/03/21 6508    sodium chloride (NS) flush 3 mL, 3 mL, IntraVENous, PRN, Bonny Piedra MD    acetaminophen (TYLENOL) tablet 650 mg, 650 mg, Oral, Q4H PRN, Bonny Piedra MD    acetaminophen (TYLENOL) suppository 650 mg, 650 mg, Rectal, Q3H PRN, Bonny Piedra MD    haloperidol lactate (HALDOL) injection 2 mg, 2 mg, SubCUTAneous, Q1H PRN, 2 mg at 12/03/21 1548 **OR** haloperidol lactate (HALDOL) injection 2 mg, 2 mg, IntraVENous, Q1H PRN, Bonny Piedra MD    hyoscyamine SL (LEVSIN/SL) tablet 0.125 mg, 0.125 mg, SubLINGual, Q4H PRN, Bonny Piedra MD    glycopyrrolate (ROBINUL) injection 0.2 mg, 0.2 mg, SubCUTAneous, Q4H PRN, Bonny Piedra MD    LORazepam (ATIVAN) injection 2 mg, 2 mg, IntraVENous, Q10MIN PRN, Bonny Piedra MD    fentaNYL (DURAGESIC) 12 mcg/hr patch 1 Patch, 1 Patch, TransDERmal, Q72H, Bonny Piedra MD, 1 Patch at 12/01/21 1459    [DISCONTINUED] morphine injection 1 mg, 1 mg, IntraVENous, Q20MIN PRN **OR** morphine injection 1 mg, 1 mg, SubCUTAneous, Q20MIN PRN, Onofre Maldonado NP    oxyCODONE IR (ROXICODONE) tablet 10 mg, 10 mg, Oral, Q3H PRN, Onofre Maldonado NP, 10 mg at 12/03/21 1350    carvediloL (COREG) tablet 6.25 mg, 6.25 mg, Oral, BID, Onofre Maldonado NP, 6.25 mg at 12/03/21 1150    senna (SENOKOT) tablet 8.6 mg, 1 Tablet, Oral, BID, Onofre Maldonado NP, 8.6 mg at 12/03/21 1150    ciprofloxacin HCl (CIPRO) tablet 250 mg, 250 mg, Oral, BID, Onofre Maldonado NP, 250 mg at 12/03/21 1150    Orders:  Orders Placed This Encounter    IP CONSULT TO SPIRITUAL CARE     Standing Status:   Standing     Number of Occurrences:   1     Order Specific Question:   Reason for Consult: Answer: Once on week one, then PRN. For [facility] Patients Only. For contracted patients, their primary hospice will continue to manage spiritual care needs.  ADULT DIET Regular; No red meat     Standing Status:   Standing     Number of Occurrences:   1     Order Specific Question:   Primary Diet:     Answer:   Regular     Order Specific Question:   Likes/Dislikes/Preferences: Answer:   No red meat    VITAL SIGNS     Standing Status:   Standing     Number of Occurrences:   1    NURSING-MISCELLANEOUS: Comfort Care Measures CONTINUOUS     Standing Status:   Standing     Number of Occurrences:   1     Order Specific Question:   Description of Order:     Answer:   Comfort Care Measures    BLADDER CHECKS     May scan bladder PRN for urinary retention and or patient discomfort     Standing Status:   Standing     Number of Occurrences:   [id number]    NURSING ASSESSMENT:  SPECIFY Assess for GIP, routine, or respite level of care. Q SHIFT Routine     Standing Status:   Standing     Number of Occurrences:   1     Order Specific Question:   Please describe the test or procedure you would like to order. Answer:   Assess for GIP, routine, or respite level of care.     PAIN ASSESSMENT Pain and Symptoms: Assess ever 4 hours and PRN, for GIP level of care. PRN Routine     Standing Status:   Standing     Number of Occurrences:   [id number]     Order Specific Question:   Please describe the test or procedure you would like to order. Answer:   Pain and Symptoms: Assess ever 4 hours and PRN, for GIP level of care.  BEDREST W/ BEDSIDE COMMODE     Standing Status:   Standing     Number of Occurrences:   1    WRAY CATHETER, CARE     1. Wray Catheter care every shift and PRN  2. Notify Physician of Wray Catheter leakage, occlusion, gross adherent sediment or accidental removal  3. Change Wray 30 days after insertion. 4. May flush catheter bid and prn leakage or gross adherent sediment or mucus. Standing Status:   Standing     Number of Occurrences:   1    DO NOT RESUSCITATE     Standing Status:   Standing     Number of Occurrences:   1     Order Specific Question:   Comfort Measures Only? Answer: Yes    OXYGEN CANNULA Liters per minute: 3; Indications for O2 therapy: RESPIRATORY DISTRESS PRN Routine     Standing Status:   Standing     Number of Occurrences:   [id number]     Order Specific Question:   Liters per minute: Answer:   3     Order Specific Question:   Indications for O2 therapy     Answer:   RESPIRATORY DISTRESS    DISCONTD: carvediloL (COREG) tablet 6.25 mg     OP SIG:Take 1 Tab by mouth two (2) times daily (with meals). Indications:  \"FOR MITRAL VALVE REGURGITATION\"      sodium chloride (NS) flush 3 mL    DISCONTD: morphine (ROXANOL) concentrated oral syringe 10 mg    DISCONTD: morphine (ROXANOL) concentrated oral syringe 10 mg    DISCONTD: morphine injection 4 mg    DISCONTD: morphine injection 4 mg    acetaminophen (TYLENOL) tablet 650 mg    acetaminophen (TYLENOL) suppository 650 mg    DISCONTD: LORazepam (ATIVAN) tablet 2 mg    DISCONTD: LORazepam (ATIVAN) injection 2 mg    DISCONTD: loperamide (IMODIUM) capsule 4 mg    DISCONTD: senna (SENOKOT) tablet 8.6 mg    OR Linked Order Group     haloperidol lactate (HALDOL) injection 2 mg     haloperidol lactate (HALDOL) injection 2 mg    hyoscyamine SL (LEVSIN/SL) tablet 0.125 mg    glycopyrrolate (ROBINUL) injection 0.2 mg    LORazepam (ATIVAN) injection 2 mg    DISCONTD: oxyCODONE IR (ROXICODONE) tablet 15 mg    DISCONTD: dexamethasone (DECADRON) 4 mg/mL injection 4 mg    fentaNYL (DURAGESIC) 12 mcg/hr patch 1 Patch    DISCONTD: morphine injection 1 mg    morphine injection 1 mg    DISCONTD: oxyCODONE IR (ROXICODONE) tablet 7.5 mg    DISCONTD: predniSONE (DELTASONE) tablet 5 mg    DISCONTD: pantoprazole (PROTONIX) tablet 40 mg     Order Specific Question:   PPI INDICATION     Answer:   SUP Prophylaxis    oxyCODONE IR (ROXICODONE) tablet 10 mg    DISCONTD: predniSONE (DELTASONE) tablet 10 mg    DISCONTD: pantoprazole (PROTONIX) tablet 40 mg     Order Specific Question:   PPI INDICATION     Answer:   SUP Prophylaxis    carvediloL (COREG) tablet 6.25 mg     OP SIG:Take 1 Tab by mouth two (2) times daily (with meals). Indications:  \"FOR MITRAL VALVE REGURGITATION\"      senna (SENOKOT) tablet 8.6 mg    DISCONTD: LORazepam (ATIVAN) tablet 0.5 mg    ciprofloxacin HCl (CIPRO) tablet 250 mg     Order Specific Question:   Antibiotic Indications     Answer:   Urinary Tract Infection     Order Specific Question:   UTI duration of therapy     Answer:   5 days    dexAMETHasone (DECADRON) tablet 2 mg    omeprazole (PRILOSEC) capsule 40 mg (Patient Supplied)     Order Specific Question:   PPI INDICATION     Answer:   Symptomatic GERD    DISCONTD: LORazepam (ATIVAN) tablet 0.5 mg    LORazepam (ATIVAN) tablet 1 mg    INITIAL PHYSICIAN ORDER: HOSPICE Level Of Care: General Inpatient; Reason for Admission: Metastatic Breast Cancer for pain management     Standing Status:   Standing     Number of Occurrences:   1     Order Specific Question:   Status     Answer:   Hospice     Order Specific Question:   Level Of Care     Answer:   General Inpatient     Order Specific Question: Reason for Admission     Answer:   Metastatic Breast Cancer for pain management     Order Specific Question:   Inpatient Hospitalization Certified Necessary for the Following Reasons     Answer:   3. Patient receiving treatment that can only be provided in an inpatient setting (further clarification in H&P documentation)     Order Specific Question:   Admitting Diagnosis     Answer:   Metastatic breast cancer [facility]) [[id number]]     Order Specific Question:   Terminal Prognosis Diagnosis(es)     Answer:   Metastatic breast cancer [facility]) [[id number]]     Order Specific Question:   Admitting Physician     Answer:   Flaca Abernathy     Order Specific Question:   Attending Physician     Answer:   Leopoldo Antu [[id number]]    IP CONSULT TO SOCIAL WORK     Standing Status:   Standing     Number of Occurrences:   1     Order Specific Question:   Reason for Consult: Answer: For [facility] Only. For contracted patients, their primary hospice will continue to manage social work needs. Allergies: Allergies   Allergen Reactions    Bactrim [Sulfamethoprim] Rash       Care Plan:  11/13/2021 Metastatic Breast Cancer  Problems (Active)     Problem: Aide personal care     Dates: Start: 11/16/21       Description: Patient is unable to provide self care r/t disease progression    Disciplines: Interdisciplinary    Goal: Patient will have personal care needs met     Dates: Start: 11/16/21       Description: Vida's personal care needs to be met throughout current hospice benefit period. Disciplines: Interdisciplinary    Intervention: Aide report findings     Dates: Start: 11/16/21       Description: Aide to report to RN any areas of skin breakdown       Intervention: Aide perform skin care     Dates: Start: 11/16/21       Description: Apply barrier cream to buttocks. Apply lotion to upper and lower extremities. Aide to report to clinician any areas of skin breakdown.        Intervention: Aide assist with oral care     Dates: Start: 11/16/21       Description: Assist patient with oral care: brush teeth       Intervention: Aide perform fingernail filing     Dates: Start: 11/16/21       Description: Perform fingernail filing. Intervention: Aide perform hair care     Dates: Start: 11/16/21       Description: Perform hair care using brush or comb. Intervention: Aide assist with dressing     Dates: Start: 11/16/21       Description: Assist patient with dressing upper and lower body. Intervention: Aide assist with foot care     Dates: Start: 11/16/21       Description: Assist patient with foot care. Intervention: Aide assist with shampoo     Dates: Start: 11/16/21       Description: Assist patient with shampooing hair in shower       Intervention: Aide assist with shower     Dates: Start: 11/16/21       Description: Assistance with shower. Clean-up shower and area after use. Intervention: Aide bowel program     Dates: Start: 11/16/21       Description: Hospice Aide to report to RN no patient bowel movement in 3 or more days. Intervention: Aide clean/tidy     Dates: Start: 11/16/21       Description: Remove sponge air filter from oxygen concentrator, clean sponge air filter with running water and squeeze dry with paper towel or cloth towel. Then return sponge air filter on oxygen concentrator. Problem: Anticipatory Grief     Dates: Start: 11/17/21       Description: Vida/Marsel and family lack spiritual and emotional support as they experience signs of decline, acknowledge their grief/pain/anxiety and begin coping with pt's decline and eventual death.     Disciplines: Interdisciplinary    Goal: Grief heard and acknowledged, anxiety reduced, patient coping identified, patient/family expressed gratitude     Dates: Start: 11/17/21       Description: Vida/Marsel and family present will feel and receive spiritual and emotional support, have their grief acknowledged, pain heard, and anxiety reduced using elements of their jerry during current certification period aeb facial and verbal acknowledgement. Vida/family will express gratitude for visit. 11/1`7/21 Vida/Marsel/family progressing towards goal aeb the sharing of EOL plans and jerry. Disciplines: Interdisciplinary    Intervention: Support grieving process     Dates: Start: 11/17/21       Description: [address] will provide spiritual and emotional support, acknowledge their grief, hear their pain, and reduce their anxiety according to their jerry tradition through presence, pastoral conversation, scripture reading/sharing and prayer as needed/requested. Problem: Anxiety/Agitation     Dates: Start: 11/16/21       Description: Patient experiences anxiety r/t disease progression    Disciplines: Interdisciplinary    Goal: Verbalize or staff assess the ability to manage anxiety     Dates: Start: 11/16/21       Description: Alex Lane and family will verbalize use of non-pharmalogical and pharmalogical measures to reduce anxiety throughout current hospice benefit period. Disciplines: Interdisciplinary    Intervention: Assess for anxiety/agitation     Dates: Start: 11/16/21       Description: Hospice RN to assess for patient anxiety, administer medications for symptom management as ordered and assure patient/caregiver understand and are able to demonstrate ability to manage patient's anxiety. Intervention: Instructon strategies to reduce anxiety/agitation (Completed)     Dates: Start: 11/16/21   End: 11/23/21    Description: Instruct patient/caregiver on strategies to reduce anxiety/agitation             Problem: Community Resource Needs     Dates: Start: 11/22/21       Description: Deficit related to resource support.     Disciplines: Interdisciplinary    Goal: Patient is receiving increased resource support to enhance ability to remain at home     Dates: Start: 11/22/21       Description: Alex Lane, spouse Helder Lino, and children Zack Lucita and Luma Vargas are receiving increased resource support AEB patient and family verbalize understadning of available resources during this benefit period. Disciplines: Interdisciplinary    Intervention: Provide assistance with identifying appropriate community resources     Dates: Start: 11/22/21       Description: Provide assistance with identifying appropriate community resources             Problem: Dyspnea due to end of life     Dates: Start: 11/16/21       Description: Patient experiences dyspnea r/t disease progression secondary to mets to lungs    Disciplines: Interdisciplinary    Goal: *PALLIATIVE CARE:  Alleviation of Dyspnea     Dates: Start: 11/16/21       Description: Jewel and family will verbalize understanding the usage of pharmacological and non-pharmacological measures to address dyspnea throughout current hospice benefit period. Disciplines: Interdisciplinary    Intervention: Instruct on management of Dyspnea     Dates: Start: 11/16/21       Description: Instruct patient/caregiver on measures to alleviate dyspnea. Assess for compliance at each visit. Problem: Emotional Support Needs     Dates: Start: 11/22/21       Description: Deficit related to emotional support. Disciplines: Interdisciplinary    Goal: Patient/family is receiving emotional support     Dates: Start: 11/22/21       Description: Jewel, spouse Nathalie Cavazos, and children Elvi Parnell and Luma Vargas are receiving increased emotional support AEB patient and family demonstrate improved coping and/or verbalize emotional needs are being addressed during this benefit period.     Disciplines: Interdisciplinary    Intervention: Provide emotional support     Dates: Start: 11/22/21       Description: Provide emotional support       Intervention: Assess for emotional distress     Dates: Start: 11/22/21       Description: Assess for emotional distress             Problem: Hospice Orientation     Dates: Start: 11/16/21       Description: Patient and family require hospice orientation r/t patient admission to [facility] for metastatic breast cancer    Disciplines: Interdisciplinary    Goal: Demonstrate understanding of hospice philosophy, plan of care, and home hospice program     Dates: Start: 11/16/21       Description: Germania Mckenna and family will demonstrate understanding of the hospice philosophy, plan of care and the home hospice program as evidenced by participation in meeting the patient's psychosocial, spiritual, medical, and physical needs inclusive of medical supplies/equipment focusing on symptoms within one month    Disciplines: Interdisciplinary    Intervention: Instruct: hospice orientation     Dates: Start: 11/16/21       Description: Instruct patient/caregiver on hospice orientation             Problem: Knowledge deficit-Administration of oxygen therapy     Dates: Start: 11/16/21       Description: Patient is prescribed oxygen r/t dyspnea    Disciplines: Interdisciplinary    Goal: Patient/caregiver will verbalize understanding of oxygen therapy     Dates: Start: 11/16/21       Description: Germania Mckenna and family will verbalize understanding of oxygen therapy and safety throughout current hospice benefit period. Disciplines: Interdisciplinary    Intervention: OXYGEN THERAPY     Dates: Start: 11/16/21       Description: Instruct patient/caregiver on purpose of O2 therapy and proper use of home oxygen: proper storage of O2 tanks and keeping a smoke free environment. Assess for safety and therapy compliance, potential hazards and fire safety risks at each visit.              Problem: Management of Home Medications     Dates: Start: 11/16/21       Description: Patient and family require continued education on management of home medications    Disciplines: Interdisciplinary    Goal: Knowledge of medication management     Dates: Start: 11/16/21       Description: Germania Mckenna and family will follow prescribed medication therapy and schedule, and verbalize understanding of purpose and side effects of medication throughout current hospice benefit period    Disciplines: Interdisciplinary    Intervention: Instruct on management of home medications     Dates: Start: 11/16/21       Description: Instruct patient/caregiver in medication administration, purpose, dosages, preparation, scheduling, side effects, food/drug interactions, storage, and potential complications. Problem: Nausea/Vomiting (Adult)     Dates: Start: 11/16/21       Description: Patient experience nausea secondary to metastatic cancer    Disciplines: Interdisciplinary    Goal: *Palliation of nausea/vomiting (Palliative Care)     Dates: Start: 11/16/21       Description: Jewel and family will verbalize and demonstrate pharmalogical measures used to control Vida's nausea throughout current hospice benefit period. Disciplines: Interdisciplinary    Intervention: Assess for nausea/vomiting and administer medications     Dates: Start: 11/16/21       Description: Hospice RN to assess for patient n/v, administer medications for symptom management as ordered and assure patient/caregiver understand and are able to demonstrate ability to manage patient's nausea. Problem: Pain     Dates: Start: 11/16/21       Description: Patient experiences chronic pain r/t osteoarthritis and disease progression secondary to metastatic breast cancer    Disciplines: Interdisciplinary    Goal: Assess satisfaction of level of comfort and symptom control     Dates: Start: 11/16/21       Description: Jewel will verbalize satisfaction with pain AEB verbalized report of pain to an acceptable level of 3/10 or less throughout current hospice benefit period.     Disciplines: Interdisciplinary    Intervention: Assess for signs/symptoms of chronic pain     Dates: Start: 11/16/21       Description: Assess patient for signs and symptoms of chronic pain each visit             Problem: Risk for Falls     Dates: Start: 11/16/21 Description: Patient is at risk for falls r/t MAHC-10 score of 8/10    Disciplines: Interdisciplinary    Goal: *Knowledge of fall prevention     Dates: Start: 11/16/21       Description: Sujey Tubbs and family will demonstrate use of safety precautions to prevent falls and improve Vida's overall safety throughout current hospice benefit period. Disciplines: Interdisciplinary    Intervention: INSTRUCT FALL PREVENTION     Dates: Start: 11/16/21       Description: Assess recent falls every visit. Instruct patient/caregiver in fall prevention strategies: use of assistive device, lighted hallways, remove throw rugs, monitor medication that may alter mental status. Document and report falls. Problem: Spiritual Evaluation     Dates: Start: 11/17/21       Description: Sujey Tubbs and family may lack lack spiritual and emotional care as they learn about signs of decline and begin coping with pt's decline and eventual death. Disciplines: Interdisciplinary    Goal: Identify beliefs/practices that support hospice experience (Resolved)     Dates: Start: 11/17/21   End: 11/17/21    Description: Kristen Rutledge and family present will feel and receive spiritual and emotional support, identify their Confucianism/spiritual resources using elements of their jerry during current certification period aeb facial and verbal acknowledgement. Vida/Benigno will express gratitude for visit. 11/17/21 Vida/Stan met goal aeb both sharing information about their jerry, Scientologist, family, and work hxs.  completed spiritual assessment. Disciplines: Interdisciplinary    Intervention: Provide spiritual support (Completed)     Dates: Start: 11/17/21   End: 11/17/21    Description: Doris Anjum will provide spiritual and emotional support, acknowledge their spiritual and Confucianism resources according to their jerry tradition through presence, pastoral conversation, scripture reading/sharing and prayer as needed/requested. Intervention: Assess spiritual needs (Completed)     Dates: Start: 11/17/21   End: 11/17/21    Description: Assist with spiritual questions            Encounter Problems (Active)     Problem: Anticipatory Grief     Dates: Start: 12/03/21       Description: GOAL: Annie Díaz, her  Sarita Elena and children Gibson Tran and Maricarmen Cagle will demonstrate appropriate anticipatory grief reactions related to Vida's  impending death as evidenced by   their ability to verbalize feelings associated with grief such as denial, bargaining, anger, and depression. They will display feelings and associated behaviors in a healthy manner during inpatient hospice stay. Disciplines: Interdisciplinary    Goal: Grief heard and acknowledged, anxiety reduced, patient coping identified, patient/family expressed gratitude     Dates: Start: 12/03/21   Expected End: 12/10/21       Disciplines: Interdisciplinary    Intervention: Assess grief responses     Dates: Start: 12/03/21       Description: [address] will assess  grief reactions with each encounter. Intervention: Support grieving process     Dates: Start: 12/03/21       Description: [address] will provide opportunities for open nonjudgmental communication and  feelings associated with grief.  will provide grief education. Problem: Anxiety/Agitation     Dates: Start: 12/02/21       Disciplines: Interdisciplinary    Goal: Verbalize or staff assess the ability to manage anxiety     Dates: Start: 12/02/21   Expected End: 12/05/21       Description: The patient/family/caregiver will verbalize and demonstrate ability to manage the patient's anxiety throughout hospice care. Disciplines: Interdisciplinary    Intervention: Assess for anxiety/agitation     Dates: Start: 12/02/21       Description: Assess for signs and symptoms of anxiety and agitation.        Intervention: Instruct/Implement strategies to reduce anxiety/agitation     Dates: Start: 12/02/21       Description: Norita Ahumada patient/caregiver on strategies to reduce anxiety/agitation. Problem: Dyspnea Due to End of Life     Dates: Start: 12/02/21       Disciplines: Interdisciplinary    Goal: Demonstrate understanding of and ability to manage respiratory symptoms at end of life     Dates: Start: 12/02/21   Expected End: 12/05/21       Description: Patient Janice Griggs will indicate effective breathing pattern AEB absence of respiratory distress each shift during inpatient hospice stay. Disciplines: Interdisciplinary    Intervention: Assess for signs and symptoms of dyspnea     Dates: Start: 12/02/21          Intervention: Instruct on causes/symptoms of dyspnea     Dates: Start: 12/02/21          Intervention: Jennifer Meeks patient/caregiver/family on strategies to effectively manage dyspnea     Dates: Start: 12/02/21                Problem: Emotional Support Needs     Dates: Start: 12/03/21       Disciplines: Interdisciplinary    Goal: Patient/family is receiving emotional support     Dates: Start: 12/03/21       Disciplines: Interdisciplinary    Intervention: Assess for emotional distress     Dates: Start: 12/03/21          Intervention: Provide emotional support of the family's cultural expressions of grief and loss     Dates: Start: 12/03/21       Description: Provide emotional support of the family's cultural expression of grief, loss, and response to illness. Problem: Falls - Risk of     Dates: Start: 12/01/21       Disciplines: Interdisciplinary    Goal: *Absence of Falls     Dates: Start: 12/01/21   Expected End: 12/04/21       Description: Kristen Griggs will remain free from falls AEB no injuries r/t falls each shift during inpatient hospice stay.           Disciplines: Interdisciplinary          Problem: Pain     Dates: Start: 12/02/21       Disciplines: Nurse, Interdisciplinary, RT    Goal: *Control of Pain     Dates: Start: 12/02/21   Expected End: 12/05/21       Description: Patient Sheri Police will exhibit decrease in pain AEB rating pain less than 3 on 1-10 scale or 3 FLACC score within each shift of receiving pain medication during inpatient hospice stay. Disciplines: Nurse, Interdisciplinary, RT    Intervention: Assess pain characteristics (eg: Intensity scale; onset; location; quality; severity; duration; frequency; radiation)     Dates: Start: 12/02/21          Intervention: Assess pain management - barriers (eg: Past pain experiences)     Dates: Start: 12/02/21          Intervention: Identify pain expectations (eg: Patient's pain goal; somatic experiences; behavioral changes; affect)     Dates: Start: 12/02/21          Intervention: Identify pain medication concerns (eg: Cultural considerations; addiction concerns)     Dates: Start: 12/02/21          Intervention: Support system identification (eg: Caregiver; community resource; family; friends; Yarsanism; support group)     Dates: Start: 12/02/21          Intervention: Monitor for change in patient condition (eg:  Vital signs changes; changes in level of consciousness; nausea; behavioral changes)     Dates: Start: 12/02/21          Intervention: Medication side-effect assessment     Dates: Start: 12/02/21          Intervention: Pain-relief response reassessment (eg: Frequency based on route of administration; effectiveness)     Dates: Start: 12/02/21             Goal: *PALLIATIVE CARE:  Alleviation of Pain     Dates: Start: 12/02/21       Disciplines: Nurse, Interdisciplinary, RT    Intervention: Refer patient for holistic services per facility     Dates: Start: 12/02/21                Problem: Pain - Acute     Dates: Start: 12/01/21       Description: Patient Sheri Police will exhibit decrease in pain AEB rating pain less than * on 1-10 scale or * FLACC score within each shift of receiving pain medication during inpatient hospice stay.      Disciplines: Interdisciplinary       Problem: Patient Education: Go to Patient Education Activity     Dates: Start: 12/01/21       Disciplines: Interdisciplinary    Goal: Patient/Family Education     Dates: Start: 12/01/21   Expected End: 12/04/21       Disciplines: Interdisciplinary          Problem: Pressure Injury - Risk of     Dates: Start: 12/02/21       Disciplines: Interdisciplinary    Goal: *Prevention of pressure injury     Dates: Start: 12/02/21   Expected End: 12/09/21       Description: Patient Wicho Osorio will remain free from acquired pressure injuries AEB zero acquired pressure injuries during assessment of skin each shift during inpatient hospice stay.       Disciplines: Interdisciplinary    Intervention: Pressure injury risk assessment tool     Dates: Start: 12/02/21          Intervention: Pressure-relieving device needs assessment     Dates: Start: 12/02/21          Intervention: Pressure-relieving device implementation (eg: Specialty beds; wheel chair cushions; heel lift boots)     Dates: Start: 12/02/21          Intervention: Skin assessment (e.g. existing ulcers, color; integrity; moisture; oliver prominences; blisters; friction/shear injuries)     Dates: Start: 12/02/21          Intervention: Skin monitoring and care (e.g. skin cleansing; keep dry, moisturize, utilization of  lotion and/or skin barrier cream)     Dates: Start: 12/02/21          Intervention: Position change (eg: Techniques to position; turn and transfer per schedule; cushion oliver prominences; float heels; encourage mobility)     Dates: Start: 12/02/21               Care Plan Problems/Goals  Report    0 of 9 Goals Met 0 of 9 Met     Progressing Towards Goal (6)      *Absence of Falls (Falls - Risk of)    Disciplines:  Interdisciplinary Expected end:  12/04/21        Outcome: Progressing Towards Goal By Rosalie Ames RN on 12/02/21 0109            *Control of Pain (Pain)    Disciplines:  Nurse, Interdisciplinary, RT Expected end:  12/05/21        Outcome: Progressing Towards Goal By Margie Delarosa RN on 12/02/21 3666            Demonstrate understanding of and ability to manage respiratory symptoms at end of life (Dyspnea Due to End of Life)    Disciplines:  Interdisciplinary Expected end:  12/05/21        Outcome: Progressing Towards Goal By Margie Delarosa RN on 12/02/21 0109            Verbalize or staff assess the ability to manage anxiety (Anxiety/Agitation)    Disciplines:  Interdisciplinary Expected end:  12/05/21        Outcome: Progressing Towards Goal By Margie Delarosa RN on 12/02/21 0109            *Prevention of pressure injury (Pressure Injury - Risk of)    Disciplines:  Interdisciplinary Expected end:  12/09/21        Outcome: Progressing Towards Goal By Margie Delarosa RN on 12/02/21 0109            Grief heard and acknowledged, anxiety reduced, patient coping identified, patient/family expressed gratitude (Anticipatory Grief)    Disciplines:  Interdisciplinary Expected end:  12/10/21        Outcome: Progressing Towards Goal By Rosita Nicholas on 12/03/21 1550                         No Outcome (3)      Patient/Family Education (Patient Education: Go to Patient Education Activity)    Disciplines:  Interdisciplinary Expected end:  12/04/21          *PALLIATIVE CARE:  Alleviation of Pain (Pain)    Disciplines:  Nurse, Interdisciplinary, RT Expected end:  -          Patient/family is receiving emotional support (Emotional Support Needs)    Disciplines:  Interdisciplinary Expected end:  -                            ___________________    Care Team Notes          POC/IDG Notes      [facility] IDG Nurse Notes by Neda Dowd RN at 12/03/21 1129  Version 1 of 1    Author: Neda Dowd RN Service: NURSING Author Type: Registered Nurse    Filed: 12/03/21 1138 Date of Service: 12/03/21 1129 Status: Signed    : Neda Dowd RN (Registered Nurse)         Patient: Michael Lamas    Date: 12/03/21  Time: 11:29 AM    [facility] Nurse Notes  1st  IDG: Pt is a [de-identified] y.o. year-old female with metastatic breast cancer who is here GIP level of care for management of pain. Continent and voiding   IV access: N/A   PO intake:% of meals  Oxygen: 2/L  Wounds:                                               PRN medications: Oxycontin 1mg x 5 doses for pain / Ativan 0.5mg x 3 doses for agitation. Scheduled meds:    Current Facility-Administered Medications   Medication Dose Route Frequency    dexAMETHasone (DECADRON) tablet 2 mg  2 mg Oral DAILY    omeprazole (PRILOSEC) capsule 40 mg (Patient Supplied)  40 mg Oral BID    fentaNYL (DURAGESIC) 12 mcg/hr patch 1 Patch  1 Patch TransDERmal Q72H    carvediloL (COREG) tablet 6.25 mg  6.25 mg Oral BID    senna (SENOKOT) tablet 8.6 mg  1 Tablet Oral BID    ciprofloxacin HCl (CIPRO) tablet 250 mg  250 mg Oral BID     Plan: Patient to change to routine LOC. Comprehensive plan of care reviewed. IDG and pt./family in agreement with plan of care. The IDG identifies through on-going assessment when a change is needed to the POC; the pt/family will receive care and services necessitated by changes in POC. Medications reviewed by the pharmacist and Medical Director. Signed by: Alex Schwab RN       [facility] IDG  Notes by Amado Iyer LMSW at 12/03/21 1131  Version 1 of 1    Author: Amado Iyer LMSW Service: -- Author Type: Licensed Masters in Social Work    Filed: 12/03/21 1137 Date of Service: 12/03/21 1131 Status: Signed    : Amado Iyer [facility] (Licensed Masters in Social Work)       Patient: Cam Donis    Date: 12/03/21  Time: 11:31 AM    \[facility]\""  Notes  Pt has changed to routine LOC. No changes in the plan of care. SW will complete assessment and care plan. SW will continue to provide ongoing emotional support and assessment of psychosocial and bereavement concerns, as well as needs until discharge.          Signed by: Edward Murillo LMSW       \[facility]\"" IDG  Notes by Jackson You Sarah Fuentes at 12/03/21 1131  Version 1 of 1    Author: Melissa De Service: Spiritual Care Author Type: Pastoral Care    Filed: 12/03/21 1131 Date of Service: 12/03/21 1131 Status: Signed    : Melissa De (Pastoral Care)       Patient: Sameera Sheth    Date: 12/03/21  Time: 11:31 AM    [facility]  Notes    Assessments pending for spiritual and bereavement care. Signed by: Jostin Sibley       [facility] IDG  Notes by Melissa De at 12/02/21 1639  Version 1 of 1    Author: Melissa De Service: Spiritual Care Author Type: Pastoral Care    Filed: 12/02/21 1639 Date of Service: 12/02/21 1639 Status: Signed    : Melissa De (Pastoral Care)       Patient: Sameera Thomass    Date: 12/02/21  Time: 4:39 PM    [facility]  Notes    / Grief Counselor has reviewed  Initial Comprehensive Assessment and plan of care. Bereavement and Spiritual Care Assessments to be completed and plan of care put in place to meet patient and family needs. Signed by: Jostin Sibley       [facility] IDG Nurse Notes by April Reed at 12/01/21 1314  Version 3 of 3    Author: April Reed Service: NURSING Author Type: Registered Nurse    Filed: 12/01/21 1759 Date of Service: 12/01/21 1314 Status: Addendum    : April Reed (Registered Nurse)    Related Notes: Original Note by April Reed (Registered Nurse) filed at 12/01/21 0228       Patient: Sameera Thomass    Date: 12/01/21  Time: 2:11 PM    [facility] Nurse Notes  Pt is an [de-identified]year old female who is on [facility] program that was admitted to [address] [place] [address] level of care with hospice dx of Breast cancer. Pt was transferred via [facility] and was transferred from stretcher to bed with maximum assistance. Pt is alert and oriented x3. Pt rates her pain on a 1-10 scale as a '2' since \"the ride over was bumpy. \" Pt has clear lung sounds, irregular heart sounds, absent bowel sounds, positive radial and pedal pulses.  Pt is pale in color with bright red cheeks. Pt states last night that she was sitting in a recliner chair, got up to cover her  with a blanket and attempted to sit on the couch and missed the cushion and landed on the floor between the couch and the coffee table. Pt is free of cuts or bruises and claims she did not hit her head. Pt's daughter is at the bedside once initial assessment was completed. NP at the bedside at time of admit. Pt has a reddened area inside her left buttock  that is not opened at this time. Pt is wearing a pull up. BSC has been ordered and pt and daughter have stated they do not want the patient to have a key catheter at this time and that she can pivot safely to [facility]. Staff will evaluate safety of use of BSC vs. Key catheter. Admission complete and initial general hopsice care plan initiated which includes spiritual, psychosocial and bereavement. IDG team and MD made aware of plan of care and immediate needs. Signed by: Panchito Rosado     3276 Pt assisted to [facility] to urinate. Pt did well pivoting but is not strong enough to hold herself up without assistance. Pt voided and assisted back to bed and positioned for comfort. 1307 Pt's Fentanyl patch 12mcg applied to her left chest. And scheduled Cipro and Prednisone given per orders and pt took without difficulty. Grilled Cheese and soup brought to the patient per her request. Safety measures in place. Door remains open for continued monitoring. 1 Pt assisted to [facility] where she voiced clear yellow urine. Pt not strong on her feet and needs staff assistance to pivot to [facility]. Pt assisted back to bed and repositioned for comfort. Dinner tray set up where she is self feeding. Safety measures in place. Door remains open for continued monitoring. 1757 pt medicated with Roxicodone 10mg PO for pain in her back rated as a '5' on a 1-10 scale. Report given to oncoming RN.            [address] IDG Nurse Notes by Shelly Wiggins at 12/01/21 1314  Version 2 of 3    Author: Colette Oglesby Service: NURSING Author Type: Registered Nurse    Filed: 12/01/21 4388 Date of Service: 12/01/21 1314 Status: Addendum    : Colette Oglesby (Registered Nurse)    Related Notes: Addendum by Colette Oglesby (Registered Nurse) filed at 12/01/21 7875  Original Note by Colette Oglesby (Registered Nurse) filed at 12/01/21 1421       Patient: Ruthie Quick    Date: 12/01/21  Time: 2:11 PM    [address] [place] [address] Nurse Notes  Pt is an [de-identified]year old female who is on [facility] program that was admitted to [facility] today GIP level of care with hospice dx of Breast cancer. Pt was transferred via [facility] and was transferred from stretcher to bed with maximum assistance. Pt is alert and oriented x3. Pt rates her pain on a 1-10 scale as a '2' since \"the ride over was bumpy. \" Pt has clear lung sounds, irregular heart sounds, absent bowel sounds, positive radial and pedal pulses. Pt is pale in color with bright red cheeks. Pt states last night that she was sitting in a recliner chair, got up to cover her  with a blanket and attempted to sit on the couch and missed the cushion and landed on the floor between the couch and the coffee table. Pt is free of cuts or bruises and claims she did not hit her head. Pt's daughter is at the bedside once initial assessment was completed. NP at the bedside at time of admit. Pt has a reddened area inside her left buttock  that is not opened at this time. Pt is wearing a pull up. BSC has been ordered and pt and daughter have stated they do not want the patient to have a key catheter at this time and that she can pivot safely to [facility]. Staff will evaluate safety of use of BSC vs. Key catheter. Admission complete and initial general hopsice care plan initiated which includes spiritual, psychosocial and bereavement. IDG team and MD made aware of plan of care and immediate needs.      Signed by: Kelli Vazquez     6285 Pt assisted to [facility] to urinate. Pt did well pivoting but is not strong enough to hold herself up without assistance. Pt voided and assisted back to bed and positioned for comfort. 1459 Pt's Fentanyl patch 12mcg applied to her left chest. And scheduled Cipro and Prednisone given per orders and pt took without difficulty. Grilled Cheese and soup brought to the patient per her request. Safety measures in place. Door remains open for continued monitoring. 1 Pt assisted to [facility] where she voiced clear yellow urine. Pt not strong on her feet and needs staff assistance to pivot to [facility]. Pt assisted back to bed and repositioned for comfort. Dinner tray set up where she is self feeding. Safety measures in place. Door remains open for continued monitoring. Report given to corina REYES. [address] [facility] Nurse Notes by Angelo Moore at 12/01/21 1314  Version 1 of 3    Author: Angelo Moore Service: NURSING Author Type: Registered Nurse    Filed: 12/01/21 1422 Date of Service: 12/01/21 1314 Status: Signed    : Angelo Moore (Registered Nurse)    Related Notes: Addendum by Angelo Moore (Registered Nurse) filed at 12/01/21 9412       Patient: Janice Griggs    Date: 12/01/21  Time: 2:11 PM    [address] [place] Nurse Notes  Pt is an [de-identified]year old female who is on [facility] program that was admitted to [facility] today [facility] level of care with hospice dx of Breast cancer. Pt was transferred via [place] and was transferred from stretcher to bed with maximum assistance. Pt is alert and oriented x3. Pt rates her pain on a 1-10 scale as a '2' since \"the ride over was bumpy. \" Pt has clear lung sounds, irregular heart sounds, absent bowel sounds, positive radial and pedal pulses. Pt is pale in color with bright red cheeks.  Pt states last night that she was sitting in a recliner chair, got up to cover her  with a blanket and attempted to sit on the couch and missed the cushion and landed on the floor between the couch and the coffee table. Pt is free of cuts or bruises and claims she did not hit her head. Pt's daughter is at the bedside once initial assessment was completed. NP at the bedside at time of admit. Pt has a reddened area inside her left buttock  that is not opened at this time. Pt is wearing a pull up. BSC has been ordered and pt and daughter have stated they do not want the patient to have a key catheter at this time and that she can pivot safely to [facility]. Staff will evaluate safety of use of BSC vs. Key catheter. Admission complete and initial general hopsice care plan initiated which includes spiritual, psychosocial and bereavement. IDG team and MD made aware of plan of care and immediate needs. Signed by: Francois Mcknight       [address] [place] [address] IDG  Notes by Mireya Murray LMSW at 12/01/21 1437  Version 1 of 1    Author: Mireya Murray LMSW Service: -- Author Type: Licensed Masters in Social Work    Filed: 12/01/21 1437 Date of Service: 12/01/21 1437 Status: Signed    : Mireya Murray, [facility] (Licensed Masters in Social Work)       Patient: Sameera Mems    Date: 12/01/21  Time: 2:37 PM    [facility]  Notes  SW has read the initial comprehensive assessment and plan of care. No immediate needs noted. Initial SW assessment visit will be completed within 5 days of admission. Signed by: Belton Epley, LMSW                Care Team Present:   Team Members Present: Physician, Nurse, , , Bereavement, Other (Comment)  Physician Team Member: Dr. Julia Castro  Nurse Team Member: Ronald Lambert  Social Work Team Member: Belton Epley Chaplain Team Member: Dio Quick  Other Discipline Present (Name):  Saira Le NP

## 2021-12-03 NOTE — PROGRESS NOTES
Problem  Anticipatory Grief    GOAL: Yane Nicholas, her  Carlton Estes and children Roland Burkitt and Katlyn Pierre will demonstrate appropriate anticipatory grief reactions related to Vida's  impending death as evidenced by their ability to verbalize feelings associated with grief such as denial, bargaining, anger, and depression. They will display feelings and associated behaviors in a healthy manner during inpatient hospice stay. Intervention:  will assess grief reactions with each visit. Intervention:  will provide a safe space for open nonjudgmental conversation and provide grief education.      Outcome: Progressing Towards Goal

## 2021-12-03 NOTE — PROGRESS NOTES
Progress Note    Patient: Shubham Whaley MRN: [id number]  SSN: [id number]    YOB: 1940  Age: [de-identified] y.o. Sex: female      Admit Date: 12/1/2021    LOS: 2 days     Subjective:     Alert and oriented. Eating small amounts. Has required oxycodone 10mg x 5 for pain in the past 24 hours. Review of Systems:  dyspnea at rest noted. C/o back pain, pain better managed with fentanyl patch. Objective:     Vitals:    12/01/21 1314 12/01/21 2019 12/02/21 0543 12/03/21 0611   BP: 138/78 139/70 (!) 156/76 (!) 172/80   Pulse: (!) 120 (!) 108 (!) 103 88   Resp: 18  16 18   Temp:   98.3 °F (36.8 °C) 98.5 °F (36.9 °C)        Intake and Output:  Current Shift: No intake/output data recorded. Last three shifts: No intake/output data recorded. Physical Exam:   GENERAL: alert, fatigued, cooperative, mild distress, appears stated age, pale  LUNG: Clear diminished breath sounds with labored respirations. Dyspnea at rest.   HEART: regular rate and rhythm  ABDOMEN: soft, non-tender, distended. Ascites. Bowel sounds active. : Continent. EXTREMITIES:  extremities with no cyanosis or edema. + pulses. SKIN: Pale. Warm to touch. Skin intact. NEUROLOGIC: Alert and oriented x 3. Generalized weakness. Able to follow commands and answer questions. PSYCHIATRIC: anxious    Lab/Data Review:  No new labs resulted in the last 24 hours.     Assessment:     Principal Problem:    Cancer of breast ([facility] [address] (3/20/2013)    Active Problems:    Cardiomyopathy ([facility] [address] (12/1/2021)      Metastatic breast cancer ([facility] [address] (12/1/2021)        Plan:     Current Facility-Administered Medications   Medication Dose Route Frequency    LORazepam (ATIVAN) tablet 1 mg  1 mg Oral Q4H    dexAMETHasone (DECADRON) tablet 2 mg  2 mg Oral DAILY    omeprazole (PRILOSEC) capsule 40 mg (Patient Supplied)  40 mg Oral BID    sodium chloride (NS) flush 3 mL  3 mL IntraVENous PRN    acetaminophen (TYLENOL) tablet 650 mg  650 mg Oral Q4H PRN    acetaminophen (TYLENOL) suppository 650 mg  650 mg Rectal Q3H PRN    haloperidol lactate (HALDOL) injection 2 mg  2 mg SubCUTAneous Q1H PRN    Or    haloperidol lactate (HALDOL) injection 2 mg  2 mg IntraVENous Q1H PRN    hyoscyamine SL (LEVSIN/SL) tablet 0.125 mg  0.125 mg SubLINGual Q4H PRN    glycopyrrolate (ROBINUL) injection 0.2 mg  0.2 mg SubCUTAneous Q4H PRN    LORazepam (ATIVAN) injection 2 mg  2 mg IntraVENous Q10MIN PRN    fentaNYL (DURAGESIC) 12 mcg/hr patch 1 Patch  1 Patch TransDERmal Q72H    morphine injection 1 mg  1 mg SubCUTAneous Q20MIN PRN    oxyCODONE IR (ROXICODONE) tablet 10 mg  10 mg Oral Q3H PRN    carvediloL (COREG) tablet 6.25 mg  6.25 mg Oral BID    senna (SENOKOT) tablet 8.6 mg  1 Tablet Oral BID    ciprofloxacin HCl (CIPRO) tablet 250 mg  250 mg Oral BID        12/1: ([facility] pt-Rachel REYES) Admitted GIP with metastatic breast cancer for management of pain, dyspnea and anxiety/agitation.      1. Pain: Oxycodone 10mg po Q3 prn. Morphine 1mg IV/SQ Q20 minutes as needed if oxycodone ineffective. Prednisone 10mg po daily. Place Fentanyl 12mcg patch.      2. Dyspnea: Oxycodone 10mg po Q3 prn. Morphine 1mg IV/SQ Q20 minutes as needed if oxycodone ineffective. Levsin 0.125mg po Q4 prn or Glycopyrrolate 0.2mg q4 prn secretions. Oxygen at 3 L/min prn.     3. Anxiety/agitation:  Lorazepam 0.5mg PO q4 hours prn. Haloperidol 2mg IV/SQ Q1 hour prn if ativan ineffective.      4. Family/Pt Support: Family at bedside during exam. Medications and plan of care discussed with nursing staff and family. Will continue to monitor for symptoms and adjust medications as needed to maintain patient comfort. PPS 30%. Case discussed with Dr. Baugh Forward. Increase ativan to 1mg po Q4 scheduled. Plan to increase Fentanyl patch to 25mcg tomorrow.    Signed By: Corky Martinez NP     December 3, 2021

## 2021-12-03 NOTE — [facility] IDG SOCIAL WORKER NOTES
Patient: Shubham Whaley    Date: 12/03/21  Time: 11:31 AM    [facility]  Notes  Pt has changed to routine LOC. No changes in the plan of care. SW will complete assessment and care plan. SW will continue to provide ongoing emotional support and assessment of psychosocial and bereavement concerns, as well as needs until discharge.          Signed by: Livia Tolentino LMSW

## 2021-12-03 NOTE — [facility] IDG NURSE NOTES
Patient: Teri Sharma    Date: 12/03/21  Time: 11:29 AM    hospitals Nurse Notes  1st  IDG: Pt is a [de-identified]y.o. year-old female with metastatic breast cancer who is here GIP level of care for management of pain. Continent and voiding   IV access: N/A   PO intake:% of meals  Oxygen: 2/L  Wounds:                                               PRN medications: Oxycontin 1mg x 5 doses for pain / Ativan 0.5mg x 3 doses for agitation. Scheduled meds:    Current Facility-Administered Medications   Medication Dose Route Frequency    dexAMETHasone (DECADRON) tablet 2 mg  2 mg Oral DAILY    omeprazole (PRILOSEC) capsule 40 mg (Patient Supplied)  40 mg Oral BID    fentaNYL (DURAGESIC) 12 mcg/hr patch 1 Patch  1 Patch TransDERmal Q72H    carvediloL (COREG) tablet 6.25 mg  6.25 mg Oral BID    senna (SENOKOT) tablet 8.6 mg  1 Tablet Oral BID    ciprofloxacin HCl (CIPRO) tablet 250 mg  250 mg Oral BID     Plan: Patient to change to routine LOC. Comprehensive plan of care reviewed. IDG and pt./family in agreement with plan of care. The IDG identifies through on-going assessment when a change is needed to the POC; the pt/family will receive care and services necessitated by changes in POC. Medications reviewed by the pharmacist and Medical Director.         Signed by: Matthew Thompson RN

## 2021-12-03 NOTE — PROGRESS NOTES
1760 Report received from off going RN. Pt was admitted on on 12/1 [facility] level of care with hospice dx of breast cancer and symptom management of pain and dyspnea. Pt received two doses of Ativan 0.5mg PO and two doses of Roxicodone 10mg PO. Currently pt is resting in bed, awake, room is quiet. Safety measures in place with bed low and locked, call bell in reach, side rails up x2. Pt is alert and oriented and knows to use her call bell for any needs. Fentanyl patch in place her left chest. Plan of care reviewed with CNA. D/C plan- pt will remain at [facility] until her passing, ongoing assessment will determine appropriate level of care. 3701 Scheduled Omeprazole given per orders, pt took without difficulty. Safety measures in place. 2318 Pt medicated with PRN dose of Oxycodone 10 mg for pain in her back that she rated '7' on a 1-10 scale, along with scheduled meds of Decadron 2mg PO. Pt took without difficulty. Pt repositioned for comfort with pillow support. Safety measures in place. Daughter is at the bedside. 1020 Pt states the medication has helped her back but asks to be repositioned with pillows for back support. Pt repositioned and states she feels better. Pt in bed putting on her make up. Daughter a the bedside. Safety measures in place. 1150 Scheduled meds given per orders along with PRN dose of Ativan 0.5mg. Pt repositioned for comfort. Safety measures in place. 1305 Pt resting in bed with  at the bedside. Cup of ice requested and brought to pt. Safety measures in place. Door is closed at this time. 1350 Pt was up to the bathroom and voided, assisted back to be and positioned for comfort. Pt rates her back pain as 7 out of 10. Oxycodode 10mg PO given for symptom management. Safety measures in place. 1430 Pt used call bell to state she was nauseated and Pt vomited approximately 200 cc brown greenish bile.  Ativan 1mg offered rectally for control of nausea symptoms but pt refused at this time. She would like to wait until she can take it orally. Pt assisted to bathroom where she had a small amount of diarrhea. Pt was assisted back to bed and positioned for comfort.  and brother at the bedside. Safety measures in place. Pt states her back hurts but wants to rest a little bit before taking any more meds. Safety measures in place. 1548 Pt medicated with Haldol 2mg SQ in right arm due to nausea and unable to take oral Ativan at this time. NP at the bedside. Family at the bedside. Safety measures in place. 1610 Pt states her nausea is better and she is having to use the bathroom. Pt assisted to bathroom to void and assisted back to bed. Pt was positioned for comfort with pillow support. Safety measures in place. Door remains open for continued monitoring. 1715 Pt complained of pain in her back being a 7 out of 10 and was medicated with Oxycodone 10mg and Ativan 1mg PO that was held at 1600. Pt positioned for comfort and tray set up and she is eating her baked potato. Safety measures in place. Door remains open for continued monitoring. [phone] Pt stating loudly \"I don't feel good\" and was heard from the thomas. This RN entered room to check on pt and she is sitting in bed without specific complaint. Pt unhappy that CNA cleared her tray and did not bring her back a coke. This RN can hear the pt's stomach rumbling while standing at the end of the bed. It was suggested at that time that the pt no drink any more coke and that a room temperature Ginger Ale can be brought to her that she can sip to help settle her stomach. All safety measures in place. Door remains open for continued monitoring. Report given to oncoming RN.

## 2021-12-04 NOTE — PROGRESS NOTES
Problem: Falls - Risk of  Goal: *Absence of Falls  Description: Patient Bradford Sandhoff will remain free from falls AEB no injuries r/t falls each shift during inpatient hospice stay. Outcome: Progressing Towards Goal  Note: Fall Risk Interventions:  Mobility Interventions: Bed/chair exit alarm, Patient to call before getting OOB         Medication Interventions: Bed/chair exit alarm, Evaluate medications/consider consulting pharmacy, Patient to call before getting OOB, Teach patient to arise slowly    Elimination Interventions: Bed/chair exit alarm, Call light in reach, Patient to call for help with toileting needs, Stay With Me (per policy)    Patient Bradford Sandhoff will remain free from falls AEB no injuries r/t falls each shift during inpatient hospice stay. Problem: Pain  Goal: *Control of Pain  Description: Patient Bradford Sandhoff will exhibit decrease in pain AEB rating pain less than 3 on 1-10 scale or 3 FLACC score within each shift of receiving pain medication during inpatient hospice stay. Outcome: Progressing Towards Goal  Note:   Patient Bradford Sandhoff will exhibit decrease in pain AEB rating pain less than * on 1-10 scale or * FLACC score within each shift of receiving pain medication during inpatient hospice stay. Goal: *PALLIATIVE CARE:  Alleviation of Pain  Outcome: Progressing Towards Goal  Note:   Patient Bradford Sandhoff will exhibit decrease in pain AEB rating pain less than * on 1-10 scale or * FLACC score within each shift of receiving pain medication during inpatient hospice stay. Problem: Anxiety/Agitation  Goal: Verbalize or staff assess the ability to manage anxiety  Description: The patient/family/caregiver will verbalize and demonstrate ability to manage the patient's anxiety throughout hospice care.   Outcome: Progressing Towards Goal  Note:   Patient Bradford Sandhoff will demonstrate appropriate motor behavior AEB less than 2 episodes of fidgety, picking or pulling at clothes on devices, yelling out, getting out of bed, etc. each shift during inpatient hospice stay. Problem: Anticipatory Grief  Goal: Grief heard and acknowledged, anxiety reduced, patient coping identified, patient/family expressed gratitude  Outcome: Progressing Towards Goal  Note:   Patient Bradford Sandhoff and or family/ caregiver will demonstrate appropriate behavior r/t impending loss AEB absence of displaced anger and or ability to verbalize feelings of grief if able to verbalize during inpatient hospice stay.

## 2021-12-04 NOTE — PROGRESS NOTES
[address] received from Harpreet Martínez RN. Patient identified by name and . Patient admitted under [facility] level of care with diagnosis of  Breast cancer with mets her level of care changed on 21. Patient is sitting up in bed speaking with nurse from previous shift. Order for patient to have her mattress changed to an air mattress, explained procedure and how this was going to be completed with understanding voiced. No signs or symptoms of anxiety, agitation, dyspnea, or nausea/vomiting. Patient is currently on room air and does not appear to be in respiratory distress. Patient reports pain level of 3/10. Patient is able to get up out of bed with one person assist. Reinforced importance of the patient using her call light when needing to get up out of the bed and use the bathroom with agreement voiced. Reviewed plan of care with CNA for this shift with understanding voiced. Discharge Plan is to remain at [facility] until her symptoms are under control and is able to return home with [facility] in home. Reassessment of proper LOC will be done on an ongoing basis. Safety measures in place including, door open for continuous monitoring, bed in low locked position, tab alert in place, call light within reach, and side rails up x2. Will continue to monitor for changes, safety, and needs. 2018 Patient requesting her pain medication, she states her pain level is at 5/10. Oxycodone 10mg PO given per MD order. Safety measures remain in place, will continue to monitor for changes, safety, and comfort. 2045 Pt observed on rounds, she is resting quietly with eyes closed and respirations even and unlabored. No facial grimacing, moaning, or signs of agitation observed. All symptoms managed at this time. No acute changes noted. FLACC 0/10. Safety measures remain in place, will continue to monitor for changes, safety, and comfort. [phone] Patient requesting her pain medication to be given with her scheduled Ativan by mouth. Oxycodone 10mg PO given per PRN order for pain. Patient rates pain at a 4/10 on pain scale. Safety measures remain in place, will continue to monitor for changes, safety, and comfort. 0025 Pt observed on rounds, she is resting quietly with eyes closed and respirations even and unlabored. No facial grimacing, moaning, or signs of agitation observed. All symptoms managed at this time. No acute changes noted. FLACC 0/10. Safety measures remain in place, will continue to monitor for changes, safety, and comfort. 0210 Patient lying in bed with eyes closed in no acute distress noted. Respirations even and unlabored. FLACC 0/10. Safety measures remain in place, will continue to monitor for changes, safety, and comfort. 3959 Patient states she is having increasing pain and would like her pain medication to be given with her scheduled Ativan. Oxycodone 10mg PO given per prn order for pain. Patient rates her pain at a 4/10 at this time. Safety measures remain in place, will continue to monitor for changes, safety, and comfort.    0501 Pt observed on rounds, she is resting quietly with eyes closed and respirations even and unlabored. No facial grimacing, moaning, or signs of agitation observed. All symptoms managed at this time. No acute changes noted. FLACC 0/10. Safety measures remain in place, will continue to monitor for changes, safety, and comfort.    7368 Patient lying in bed with eyes closed in no acute distress noted. Respirations even and unlabored. FLACC 0/10. Safety measures remain in place, will continue to monitor for changes, safety, and comfort.     Report given to oncoming RN

## 2021-12-04 NOTE — PROGRESS NOTES
Report received from off-going nurse, Elijah Garcia RN visual identification made, assumed care of pt. Pt resting quietly with eyes opened, no agitation or restlessness, no grimacing or groaning. Pt respirations unlabored. Tab alert in place, rails up x 2, bed in lowest position, safety maintained. FLACC 0. Discharge plan is for pt to remain at [facility] level of care until discharge. Will continue to evaluate pt discharge plan for potential changes. 1942 CNA assisted pt up to bathroom. 0868 assisted pt up in chair to eat breakfast    0828 administered scheduled ativan and dexamethasone po, pt took with applesauce. Pt ate about half of her breakfast.     0900 visitor at bedside. 1030 NP Joanne at bedside. 1240 assisted pt up to chair an set up lunch. Administered scheduled ativan, coreg and held senna due to diarrhea. Placed 25 mcg fentanyl patch on pt right arm. Pt states she has pain 8/10 in her back administered oxycodone 10 mg po. Assisted pt back to bed and placed call bell in reach. 1334 per pt's request repositioned pillows behind her back    1500 pt resting quietly, with eyes closed. 1645 administered scheduled po medications. Pt took with apple sauce. Set up dinner tray for pt.    1828 assisted pt up to bathroom, assisted pt up in bed with two assist. Pt moaning and grimacing. States she has pain on right chest above her liver. Administered oxycodone for pain.      Report given to Elijah Garcia RN

## 2021-12-05 NOTE — PROGRESS NOTES
Progress Note    Patient: Elva Angulo MRN: [id number]  SSN: [id number]    YOB: 1940  Age: [de-identified] y.o. Sex: female      Admit Date: 12/1/2021    LOS: 4 days     Subjective:    Rounding complete patient was admitted for Respite and for pain medication adjustment from home. MAR reviewed. Patient still requiring frequent prn dosing for pain. Having some nausea. Review of Systems:  Gastrointestinal: positive for dyspepsia, reflux symptoms and nausea  and poor appetite    Objective:     Vitals:    12/03/21 2000 12/04/21 1238 12/04/21 1900 12/05/21 1204   BP: (!) 151/71 (!) 140/64 (!) 161/73 (!) 152/78   Pulse: 96 95 (!) 102 96   Resp: 20  22    Temp: 98.4 °F (36.9 °C)           Intake and Output:  Current Shift: 12/05 0701 - 12/05 1900  In: 100 [P.O.:100]  Out: -   Last three shifts: 12/03 1901 - 12/05 0700  In: 500 [P.O.:500]  Out: -     Physical Exam:   GENERAL: fatigued, cooperative, no distress, appears stated age, pale  LUNG: rales R anterior, L anterior  HEART: irregularly irregular rhythm  ABDOMEN: soft, non-tender. Bowel sounds normal. No masses,  no organomegaly, rounded  NEUROLOGIC: AOx3. Gait normal. Reflexes and motor  weakend strength normal and symmetric. Cranial nerves 2-12 and sensation grossly intact. Lab/Data Review:  No new labs resulted in the last 24 hours.     Assessment:     Principal Problem:    Cancer of breast ([facility] [address] (3/20/2013)    Active Problems:    Cardiomyopathy ([facility] [address] (12/1/2021)      Metastatic breast cancer ([facility] [address] (12/1/2021)        Plan:     Current Facility-Administered Medications   Medication Dose Route Frequency    fentaNYL (DURAGESIC) 25 mcg/hr patch 1 Patch  1 Patch TransDERmal Q72H    dexAMETHasone (DECADRON) tablet 2 mg  2 mg Oral BID    alum-mag hydroxide-simeth (MYLANTA) oral suspension 30 mL  30 mL Oral Q4H PRN    LORazepam (ATIVAN) tablet 1 mg  1 mg Oral Q4H    omeprazole (PRILOSEC) capsule 40 mg (Patient Supplied)  40 mg Oral BID    sodium chloride (NS) flush 3 mL  3 mL IntraVENous PRN    acetaminophen (TYLENOL) tablet 650 mg  650 mg Oral Q4H PRN    acetaminophen (TYLENOL) suppository 650 mg  650 mg Rectal Q3H PRN    haloperidol lactate (HALDOL) injection 2 mg  2 mg SubCUTAneous Q1H PRN    Or    haloperidol lactate (HALDOL) injection 2 mg  2 mg IntraVENous Q1H PRN    hyoscyamine SL (LEVSIN/SL) tablet 0.125 mg  0.125 mg SubLINGual Q4H PRN    glycopyrrolate (ROBINUL) injection 0.2 mg  0.2 mg SubCUTAneous Q4H PRN    LORazepam (ATIVAN) injection 2 mg  2 mg IntraVENous Q10MIN PRN    morphine injection 1 mg  1 mg SubCUTAneous Q20MIN PRN    oxyCODONE IR (ROXICODONE) tablet 10 mg  10 mg Oral Q3H PRN    carvediloL (COREG) tablet 6.25 mg  6.25 mg Oral BID    senna (SENOKOT) tablet 8.6 mg  1 Tablet Oral BID     Daughter at bedside  Fentynl patch increased to 25 mcg  Decadron is increased to bid  Prn maalox ordered for increased reflux. Plan is discharge Monday.        Signed By: Markos Augustin NP     December 5, 2021

## 2021-12-05 NOTE — PROGRESS NOTES
Problem: Falls - Risk of  Goal: *Absence of Falls  Description: Kristen Lamas will remain free from falls AEB no injuries r/t falls each shift during inpatient hospice stay. Outcome: Progressing Towards Goal  Note: Fall Risk Interventions:  Mobility Interventions: Bed/chair exit alarm         Medication Interventions: Bed/chair exit alarm    Elimination Interventions: Bed/chair exit alarm, Call light in reach    History of Falls Interventions: Bed/chair exit alarm         Problem: Patient Education: Go to Patient Education Activity  Goal: Patient/Family Education  Outcome: Progressing Towards Goal     Problem: Pain  Goal: *Control of Pain  Description: Kristen Lamas will exhibit decrease in pain AEB rating pain less than 3 on 1-10 scale or 3 FLACC score within each shift of receiving pain medication during inpatient hospice stay. Outcome: Progressing Towards Goal     Problem: Dyspnea Due to End of Life  Goal: Demonstrate understanding of and ability to manage respiratory symptoms at end of life  Description: Kristen Lamas will indicate effective breathing pattern AEB absence of respiratory distress each shift during inpatient hospice stay. Outcome: Progressing Towards Goal  Note: Pt on oxygen prn     Problem: Anxiety/Agitation  Goal: Verbalize or staff assess the ability to manage anxiety  Description: The patient/family/caregiver will verbalize and demonstrate ability to manage the patient's anxiety throughout hospice care. Outcome: Progressing Towards Goal     Problem: Pressure Injury - Risk of  Goal: *Prevention of pressure injury  Description: Kristen Lamas will remain free from acquired pressure injuries AEB zero acquired pressure injuries during assessment of skin each shift during inpatient hospice stay.     Outcome: Progressing Towards Goal  Note: Pressure Injury Interventions:  Sensory Interventions: Pressure redistribution bed/mattress (bed type), Minimize linen layers, Keep linens dry and wrinkle-free, Float heels    Moisture Interventions: Absorbent underpads, Apply protective barrier, creams and emollients    Activity Interventions: Pressure redistribution bed/mattress(bed type)    Mobility Interventions: Pressure redistribution bed/mattress (bed type), Float heels    Nutrition Interventions: Document food/fluid/supplement intake    Friction and Shear Interventions: Apply protective barrier, creams and emollients, Lift sheet

## 2021-12-05 NOTE — PROGRESS NOTES
Reny Crowley 157 received from [facility], RN. Patient identified by name and . Patient admitted under [facility] level of care with diagnosis of breast cancer with mets. Her level of care changed on 21 to routine level of care. Patient is sitting up in bed looking through a magazinet. No signs or symptoms of anxiety, agitation, dyspnea, or nausea/vomiting. Patient is currently wearing her oxygen that is set at 2/min via NC. Patient reports pain level of 7/10. She states her pain is worse when she inhales and it is located under her \"chest bone. \" Patient requests her Oxycodone for pain, patient is unable to receive her Oxycodone until  but she has Morphine available but patient refuses to take the it. Patient is able to get up out of bed with one person assist. Reinforced importance of the patient using her call light when needing to get up out of the bed and use the bathroom with agreement voiced. Reviewed plan of care with CNA for this shift with understanding voiced, CNA operates under the supervision of the RN. Discharge Plan is to remain at [facility] until her symptoms are under control and is able to return home with [facility] in home. Reassessment of proper LOC will be done on an ongoing basis. Safety measures in place including, door open for continuous monitoring, bed in low locked position, tab alert in place, call light within reach, and side rails up x2. Will continue to monitor for changes, safety, and needs.       Patient requests her Oxycodone for complaint of pain rated at an 8/10. Patient given Oxycodone 10mg PO per prn order. Patient assisted back to bed and repositioned for comfort. Safety measures in place including, door open for continuous monitoring, bed in low locked position, tab alert in place, call light within reach, and side rails up x2. Will continue to monitor for changes, safety, and needs.      Pt observed on rounds, she is resting quietly with eyes closed and respirations even and unlabored. No facial grimacing, moaning, or signs of agitation observed. All symptoms managed at this time. No acute changes noted. FLACC 0/10. Safety measures remain in place, will continue to monitor for changes, safety, and comfort. 8752 Patient lying in bed with eyes closed in no acute distress noted. Respirations even and unlabored. FLACC 0/10. Safety measures remain in place, will continue to monitor for changes, safety, and comfort. 5232 Patient up in bed having just returning to the bed after going to the bathroom. Patient states she having pain and would like her Oxycodone. Oxycodone 10mg PO given per prn order for pain. Patient rates her pain at a 5/10. Safety measures in place including, door open for continuous monitoring, bed in low locked position, tab alert in place, call light within reach, and side rails up x2. Will continue to monitor for changes, safety, and needs. 8899 Patient lying in bed with eyes closed in no acute distress noted. Respirations even and unlabored. FLACC 0/10. Safety measures remain in place, will continue to monitor for changes, safety, and comfort.     Report given to oncoming RN

## 2021-12-05 NOTE — PROGRESS NOTES
Problem: Falls - Risk of  Goal: *Absence of Falls  Description: Patient Bella Hunter will remain free from falls AEB no injuries r/t falls each shift during inpatient hospice stay. Outcome: Progressing Towards Goal  Note: Fall Risk Interventions:  Mobility Interventions: Bed/chair exit alarm         Medication Interventions: Bed/chair exit alarm    Elimination Interventions: Bed/chair exit alarm    History of Falls Interventions: Bed/chair exit alarm, Door open when patient unattended         Problem: Pain  Goal: *Control of Pain  Description: Kristen Hunter will exhibit decrease in pain AEB rating pain less than 3 on 1-10 scale or 3 FLACC score within each shift of receiving pain medication during inpatient hospice stay. Outcome: Progressing Towards Goal     Problem: Dyspnea Due to End of Life  Goal: Demonstrate understanding of and ability to manage respiratory symptoms at end of life  Description: Kristen Hunter will indicate effective breathing pattern AEB absence of respiratory distress each shift during inpatient hospice stay. Outcome: Progressing Towards Goal     Problem: Anxiety/Agitation  Goal: Verbalize or staff assess the ability to manage anxiety  Description: The patient/family/caregiver will verbalize and demonstrate ability to manage the patient's anxiety throughout hospice care. Outcome: Progressing Towards Goal     Problem: Pressure Injury - Risk of  Goal: *Prevention of pressure injury  Description: Kristen Hunter will remain free from acquired pressure injuries AEB zero acquired pressure injuries during assessment of skin each shift during inpatient hospice stay.     Outcome: Progressing Towards Goal  Note: Pressure Injury Interventions:  Sensory Interventions: Keep linens dry and wrinkle-free, Minimize linen layers    Moisture Interventions: Absorbent underpads, Apply protective barrier, creams and emollients    Activity Interventions: Pressure redistribution bed/mattress(bed type)    Mobility Interventions: Pressure redistribution bed/mattress (bed type)    Nutrition Interventions: Document food/fluid/supplement intake    Friction and Shear Interventions: Apply protective barrier, creams and emollients, Lift sheet

## 2021-12-05 NOTE — PROGRESS NOTES
Problem: Pain  Goal: *Control of Pain  Description: Kristen Marquez will exhibit decrease in pain AEB rating pain less than 3 on 1-10 scale or 3 FLACC score within each shift of receiving pain medication during inpatient hospice stay. Outcome: Progressing Towards Goal  Note:   Kristen Marquez will exhibit decrease in pain AEB rating pain less than * on 1-10 scale or * FLACC score within each shift of receiving pain medication during inpatient hospice stay. Problem: Falls - Risk of  Goal: *Absence of Falls  Description: Kristen Marquez will remain free from falls AEB no injuries r/t falls each shift during inpatient hospice stay. Note: Fall Risk Interventions:  Mobility Interventions: Bed/chair exit alarm         Medication Interventions: Bed/chair exit alarm    Elimination Interventions: Bed/chair exit alarm, Call light in reach    History of Falls Interventions: Bed/chair exit alarm    Kristen Marquez will remain free from falls AEB no injuries r/t falls each shift during inpatient hospice stay. Problem: Pain  Goal: *PALLIATIVE CARE:  Alleviation of Pain  Note:   Kristen Marquez will exhibit decrease in pain AEB rating pain less than * on 1-10 scale or * FLACC score within each shift of receiving pain medication during inpatient hospice stay. Problem: Anxiety/Agitation  Goal: Verbalize or staff assess the ability to manage anxiety  Description: The patient/family/caregiver will verbalize and demonstrate ability to manage the patient's anxiety throughout hospice care. Note:   Kristen Marquez will demonstrate appropriate motor behavior AEB less than 2 episodes of fidgety, picking or pulling at clothes on devices, yelling out, getting out of bed, etc. each shift during inpatient hospice stay.       Problem: Pain  Goal: *PALLIATIVE CARE:  Alleviation of Pain  Note:   Kristen Marquez will exhibit decrease in pain AEB rating pain less than * on 1-10 scale or * FLACC score within each shift of receiving pain medication during inpatient hospice stay. Problem: Anxiety/Agitation  Goal: Verbalize or staff assess the ability to manage anxiety  Description: The patient/family/caregiver will verbalize and demonstrate ability to manage the patient's anxiety throughout hospice care. Note:   Patient Adelita Dailey will demonstrate appropriate motor behavior AEB less than 2 episodes of fidgety, picking or pulling at clothes on devices, yelling out, getting out of bed, etc. each shift during inpatient hospice stay.

## 2021-12-06 NOTE — DISCHARGE SUMMARY
Discharge Summary     Patient: Nessa Gates MRN: [id number]  SSN: [id number]    YOB: 1940  Age: [de-identified] y.o. Sex: female       Admit Date: 12/1/2021    Discharge Date: 12/07/21 at 1130    Admission Diagnoses: Metastatic breast cancer [facility]) [C50.919]    Discharge Diagnoses:   Problem List as of 12/6/2021 Date Reviewed: 12/1/2021          Codes Class Noted - Resolved    Cardiomyopathy ([facility] ICD-10-CM: I42.9  ICD-9-CM: 425.4  12/1/2021 - Present        * (Principal)  Metastatic breast cancer ([facility] ICD-10-CM: C50.919  ICD-9-CM: 174.9  12/1/2021 - Present        Chronic pain syndrome ICD-10-CM: G89.4  ICD-9-CM: 338.4  1/24/2018 - Present        Hypothyroidism due to acquired atrophy of thyroid ICD-10-CM: E03.4  ICD-9-CM: 244.8, 246.8  11/15/2016 - Present        Mixed hyperlipidemia ICD-10-CM: E78.2  ICD-9-CM: 272.2  2/25/2016 - Present        Anxiety ICD-10-CM: F41.9  ICD-9-CM: 300.00  10/21/2015 - Present        Essential hypertension ICD-10-CM: I10  ICD-9-CM: 401.9  6/17/2015 - Present           Discharge Condition: R [facility]con[facility] [address] Course: Discharge from: Routine stay at [facility] to home with [facility]. Discharge 12/07/21 at 1130 via ambulance. Status at time of discharge is routine. Consults: None    Significant Diagnostic Studies: None    Disposition: home with [facility]    Discharge Medications:   Current Discharge Medication List      START taking these medications    Details   fentaNYL (DURAGESIC) 25 mcg/hr PATCH 1 Patch by TransDERmal route every seventy-two (72) hours for 30 days. Max Daily Amount: 1 Patch. Qty: 5 Patch, Refills: 0    Associated Diagnoses: Metastatic breast cancer ([facility]         CONTINUE these medications which have CHANGED    Details   predniSONE (DELTASONE) 20 mg tablet Take 20 mg by mouth daily (with breakfast).  Indications: cancer related pain  Qty: 14 Tablet, Refills: 0         CONTINUE these medications which have NOT CHANGED    Details oxyCODONE IR (ROXICODONE) 10 mg tab immediate release tablet Take 10 mg by mouth every three (3) hours as needed for Pain. D-MANNOSE, BULK, Take 500 mg by mouth two (2) times a day. LORazepam (Ativan) 1 mg tablet Take 1 Tablet by mouth every four (4) hours as needed (anxiety, sleep, and/or nausea/vomiting unrelieved by zofran). ondansetron (ZOFRAN ODT) 8 mg disintegrating tablet Take 8 mg by mouth every six (6) hours as needed for Nausea or Vomiting.         senna (Senna) 8.6 mg tablet Take 1 Tablet by mouth two (2) times daily as needed for Constipation. omeprazole (PRILOSEC) 40 mg capsule Take 40 mg by mouth two (2) times a day. Indications: gastroesophageal reflux disease      DULoxetine (CYMBALTA) 30 mg capsule TAKE ONE CAPSULE BY MOUTH EVERY DAY        carvedilol (COREG) 6.25 mg tablet Take 1 Tab by mouth two (2) times daily (with meals). levothyroxine (SYNTHROID) 88 mcg tablet Take 1 Tab by mouth Daily (before breakfast). ramipril (ALTACE) 5 mg capsule Take 1 Cap by mouth daily. STOP taking these medications       ciprofloxacin HCl (CIPRO) 250 mg tablet Comments:   Reason for Stopping:         oxygen-air delivery systems ([address] [place] [address] ) Comments:   Reason for Stopping:               Activity: Activity as tolerated with assistance  Diet: Diet as tolerated  Wound Care: None needed  Oxygen: Oxygen at 3 L/min via NC as needed for shortness of breath  Equipment: Equipment will be delivered prior to discharge: and oxygen concentrator    Follow-up Appointments   Procedures    FOLLOW UP VISIT Appointment in: Other (Specify) Follow-up with home hospice per agency protocol. Follow-up with home hospice per agency protocol. Standing Status:   Standing     Number of Occurrences:   1     Order Specific Question:   Appointment in     Answer:    Other (Specify)       Signed By: Myron Harper NP     December 6, 2021

## 2021-12-06 NOTE — PROGRESS NOTES
[facility] received from [address] [place] [address]. Pt name and  identified. Pt in bed, resting with eyes closed. No signs or symptoms of pain, shortness of breath, anxiety , seizures or nausea/vomiting. FLACC 0/10. Comfort and safety measures in place. HOB elevated 30 degrees. Side rails up x2. Bed low and locked. Bed alarm tab in place. Call light in reach of patient, Door open for monitored visualization and hearing of patient. Care Plan reviewed and collaboration done with CNA. Pt's Level of Care is Routine however will continue to assess change in LOC, medication & comfort needs, while collaborating with the Interdisciplinary Team.    0820: Scheduled Prilosec and Ativan given per [facility]. Pt set up for breakfast, eating slowly and tolerating well. Pt offers no c.o at present. Very pleasant. Pt remains restful, respirations even and nonlabored. No facial grimacing noted. FLACC 0/10. No additional sx's of pain, Dyspnea, Agitation or Nausea  to manage at this time. 1000: Bed bath deferred by both pt and daughter, \"Ill do it when when we get her home\". Daughter at bedside, assisting with pt's needs. Pt offers no c.o.    1200: Pt c/o mid back pain 8/10, Medicated with Oxycodone 10 mg po. Set up for lunch. Daughter at bedside. 1330: Pt resting intermittently in bed, PRN Oxy effective. No additional sx's of pain, Dyspnea, Agitation or Nausea  to manage at this time. Comfort and safety measures maintained. Call light in reach. 1529: Scheduled Meds given per MAR. PRN Oxycodone given for mid back pain, 8/10 after assistance to and from bathroom. Pt remains restful, respirations even and nonlabored. No facial grimacing noted. FLACC 0/10. No additional sx's of pain, Dyspnea, Agitation or Nausea  to manage at this time. 1635: REASSESS: PRN Oxycodone effective, pt attempting to sleep     1708: Pt still sleeping, dinner tray to the side for now, Comfort and safety measures maintained. Call light in reach.      1808: Pt observed on rounds, continues sleeping with eyes closed . Respirations even and nonlabored. No facial grimacing noted. No moaning or agitation. Flacc 0/10. No additional symptoms to manage at this time. Comfort and Safety measures maintained. Alarm exit tab in place.     Shift Report given to Colby Espinal RN

## 2021-12-06 NOTE — PROGRESS NOTES
Problem: Falls - Risk of  Goal: *Absence of Falls  Description: Kristen Jones will remain free from falls AEB no injuries r/t falls each shift during inpatient hospice stay. Outcome: Progressing Towards Goal  Note: Fall Risk Interventions:  Mobility Interventions: Bed/chair exit alarm         Medication Interventions: Bed/chair exit alarm    Elimination Interventions: Bed/chair exit alarm, Call light in reach, Toileting schedule/hourly rounds    History of Falls Interventions: Bed/chair exit alarm, Door open when patient unattended, Room close to nurse's station         Problem: Dyspnea Due to End of Life  Goal: Demonstrate understanding of and ability to manage respiratory symptoms at end of life  Description: Kristen Jones will indicate effective breathing pattern AEB absence of respiratory distress each shift during inpatient hospice stay. Outcome: Progressing Towards Goal     Problem: Anxiety/Agitation  Goal: Verbalize or staff assess the ability to manage anxiety  Description: The patient/family/caregiver will verbalize and demonstrate ability to manage the patient's anxiety throughout hospice care. Outcome: Progressing Towards Goal     Problem: Pressure Injury - Risk of  Goal: *Prevention of pressure injury  Description: Kristen Jones will remain free from acquired pressure injuries AEB zero acquired pressure injuries during assessment of skin each shift during inpatient hospice stay.     Outcome: Progressing Towards Goal  Note: Pressure Injury Interventions:  Sensory Interventions: Assess changes in LOC, Avoid rigorous massage over bony prominences, Float heels, Keep linens dry and wrinkle-free    Moisture Interventions: Absorbent underpads    Activity Interventions: Pressure redistribution bed/mattress(bed type)    Mobility Interventions: HOB 30 degrees or less, Pressure redistribution bed/mattress (bed type)    Nutrition Interventions: Document food/fluid/supplement intake, Offer support with meals,snacks and hydration    Friction and Shear Interventions: Apply protective barrier, creams and emollients, Feet elevated on foot rest, HOB 30 degrees or less, Minimize layers                Problem: Pressure Injury - Risk of  Goal: *Prevention of pressure injury  Description: Document Suleiman Scale and appropriate interventions in the flowsheet.   Outcome: Progressing Towards Goal  Note: Pressure Injury Interventions:  Sensory Interventions: Assess changes in LOC, Avoid rigorous massage over bony prominences, Float heels, Keep linens dry and wrinkle-free    Moisture Interventions: Absorbent underpads    Activity Interventions: Pressure redistribution bed/mattress(bed type)    Mobility Interventions: HOB 30 degrees or less, Pressure redistribution bed/mattress (bed type)    Nutrition Interventions: Document food/fluid/supplement intake, Offer support with meals,snacks and hydration    Friction and Shear Interventions: Apply protective barrier, creams and emollients, Feet elevated on foot rest, HOB 30 degrees or less, Minimize layers

## 2021-12-06 NOTE — PROGRESS NOTES
Bedside report received from off-going RN visual identification made, assumed care of pt. Pt resting quietly with eyes closed, no agitation or restlessness, no grimacing or groaning. Pt respirations unlabored. Tab alert in place, rails up x 2, bed in lowest position, safety maintained. FLACC 0. Pt is at GIP level of care, no change to pt discharge plan. Pt to stay at [facility] until passing. Plan of care reviewed with CNA. 1941- Scheduled Coreg, ativan, and senokot: PRN oxycodone given in applesauce. Pt resting comfortably in bed with eyes closed; no signs of pain or distress noted. RR non labored. No agitation, NVD, or SOB. FLACC 0/10.    2302- Scheduled ativan and PRN oxycodone given in applesauce. Pt observed on rounds, resting with eyes closed. Respirations remain even and nonlabored. No facial grimacing noted. No moaning or agitation. Flacc 0/10. No additional symptoms to manage at this time. No acute changes noted. Comfort and Safety measures maintained. Alarm exit tab in place. Call light in reach. 0126- Pt observed on rounds, resting with eyes closed. Respirations remain even and nonlabored. No facial grimacing noted. No moaning or agitation. Flacc 0/10. No additional symptoms to manage at this time. No acute changes noted. Comfort and Safety measures maintained. Alarm exit tab in place. Call light in reach. 0421-Scheduled ativan and PRN oxycodone given in applesauce. Pt observed on rounds, resting with eyes closed. Respirations remain even and nonlabored. No facial grimacing noted. No moaning or agitation. Flacc 0/10. No additional symptoms to manage at this time. No acute changes noted. Comfort and Safety measures maintained. Alarm exit tab in place. Call light in reach. 0633-Pt resting comfortably in bed with eyes closed; no signs of pain or distress noted. RR non labored. No agitation, NVD, or SOB. FLACC 0/10.

## 2021-12-06 NOTE — PROGRESS NOTES
BRANDON spoke with pt's daughter Salvador Santos as she came in to [facility] to visit with pt. BRANDON and Salvador Santos discussed pt history and background information for pt. BRANDON provided emotional support throughout this discussion. BRANDON then spoke with pt about discharge planning and will update pt and Salvador Postin after IDG. BRANDON called pt's in home [place] and gave her an update about pt's mental health history and will update Sadaf on discharge date/time once it is determined. 1415: BRANDON set up transport for pt for tomorrow at 11:30am. BRANDON updated pt and her daughter Salvador Santos on the transport time. Both were thankful and pt is excited to be home. BRANDON will continue to provide emotional support and address any needs. BRANDON is updating [address] on the discharge time. BRANDON let Edwige Costa Nurse Antoni Wheeler know the discharge time as well.

## 2021-12-07 NOTE — Clinical Note
Thank you!   ----- Message -----  From: Hu Parrish  Sent: 12/7/2021  10:17 AM EST  To: AMY Prasad visited with Tigist Castillo while she was in care at [facility]. See note in Epic dated 12/7/21.

## 2021-12-07 NOTE — PROGRESS NOTES
Problem: Falls - Risk of  Goal: *Absence of Falls  Description: Kristen Angulo will remain free from falls AEB no injuries r/t falls each shift during inpatient hospice stay. Outcome: Progressing Towards Goal  Note: Fall Risk Interventions:  Mobility Interventions: Bed/chair exit alarm         Medication Interventions: Bed/chair exit alarm, Evaluate medications/consider consulting pharmacy    Elimination Interventions: Bed/chair exit alarm, Call light in reach, Patient to call for help with toileting needs    History of Falls Interventions: Bed/chair exit alarm, Door open when patient unattended, Evaluate medications/consider consulting pharmacy, Room close to nurse's station         Problem: Pain  Goal: *Control of Pain  Description: Kristen Angulo will exhibit decrease in pain AEB rating pain less than 3 on 1-10 scale or 3 FLACC score within each shift of receiving pain medication during inpatient hospice stay. Outcome: Progressing Towards Goal  Goal: *PALLIATIVE CARE:  Alleviation of Pain  Outcome: Progressing Towards Goal     Problem: Dyspnea Due to End of Life  Goal: Demonstrate understanding of and ability to manage respiratory symptoms at end of life  Description: Kristen Angulo will indicate effective breathing pattern AEB absence of respiratory distress each shift during inpatient hospice stay. Outcome: Progressing Towards Goal     Problem: Anxiety/Agitation  Goal: Verbalize or staff assess the ability to manage anxiety  Description: The patient/family/caregiver will verbalize and demonstrate ability to manage the patient's anxiety throughout hospice care.   Outcome: Progressing Towards Goal     Problem: Pressure Injury - Risk of  Goal: *Prevention of pressure injury  Description: Kristen Anuglo will remain free from acquired pressure injuries AEB zero acquired pressure injuries during assessment of skin each shift during inpatient hospice stay.    Outcome: Progressing Towards Goal  Note: Pressure Injury Interventions:  Sensory Interventions: Assess changes in LOC, Minimize linen layers, Pressure redistribution bed/mattress (bed type), Check visual cues for pain, Keep linens dry and wrinkle-free, Pad between skin to skin    Moisture Interventions: Absorbent underpads, Maintain skin hydration (lotion/cream), Minimize layers, Moisture barrier, Apply protective barrier, creams and emollients    Activity Interventions: Pressure redistribution bed/mattress(bed type)    Mobility Interventions: Pressure redistribution bed/mattress (bed type), Float heels, HOB 30 degrees or less    Nutrition Interventions: Document food/fluid/supplement intake    Friction and Shear Interventions: Minimize layers, Lift sheet                Problem: Pressure Injury - Risk of  Goal: *Prevention of pressure injury  Description: Document Suleiman Scale and appropriate interventions in the flowsheet.   Outcome: Progressing Towards Goal  Note: Pressure Injury Interventions:  Sensory Interventions: Assess changes in LOC, Minimize linen layers, Pressure redistribution bed/mattress (bed type), Check visual cues for pain, Keep linens dry and wrinkle-free, Pad between skin to skin    Moisture Interventions: Absorbent underpads, Maintain skin hydration (lotion/cream), Minimize layers, Moisture barrier, Apply protective barrier, creams and emollients    Activity Interventions: Pressure redistribution bed/mattress(bed type)    Mobility Interventions: Pressure redistribution bed/mattress (bed type), Float heels, HOB 30 degrees or less    Nutrition Interventions: Document food/fluid/supplement intake    Friction and Shear Interventions: Minimize layers, Lift sheet

## 2021-12-07 NOTE — HOSPICE
This RN knocked at the door with no answer, entered as door was unlocked and patient had just arrived home from [facility]. Upon entering residence observed patient, naked from waist up, ambulating from back of home, down one step, to living area without assistive device.  was SBA who is also unsteady on his feet. Oxygen tubing was caught in a door way in the [place]. Patient's gait was slow and unsteady with forward bent posture. This RN immediately came to patient's side as SBA and released oxygen tubing from patient's face for safety. Patient safely transferred to recliner without incident. This RN requested that  obtain walker for teaching, complied. Oxygen tubing released from door way and placed back on patient's nose. Dyspnea noted throughout visit even after oxygen replaced. This RN then educated patient regarding risk for falls and encouraged her to utilize walker when transferring and ambulating. This RN requested that patient return demonstrate with transferring and walking with walker. Patient complied and demonstrated good understanding of how to utilize walker. Denies any questions regarding use. Assessment as follows: heart rate regular but tachycardic, lungs diminished at bases with tachypnea noted throughout visit, bowel sounds hypoactive, and no gross edema noted. This RN again educated patient and  regarding ambulation with walker for safety and to prevent falls. Both verbalize understanding and in agreement with POC, teaching ongoing. Events of visit reported to daughter Jesús Altman. This RN encouraged Jesús Altman, who is also an RN, to continue to educate regarding falls and use of assistive device. Demonstrates good understanding and in agreement with POC. Jesús Altman states that she will check on her Mom this evening and set up medications. This RN to follow up on Friday for continued physical and safety assessment and teaching.  At the end of visit patient was comfortable in recliner with walker at side. Instructed patient and  to contact this RN or office after hours with any acute changes or needs.

## 2021-12-07 NOTE — PROGRESS NOTES
1006 Report received from off going RN. Pt was admitted on 12/1 and is now Routine level of care with hospice dx of breast cancer with mets and symptom management of pain and dyspnea. Pt received one PRN dose of Roxicodone overnight. Pt is on scheduled Ativan every four hours. Currently pt is resting in bed, watching TV and denies needs at this time. Safety measures in place with bed low and locked, call bell in reach, side rails up x2. Plan of care reviewed with CNA. D/C plan is for patient to return home today with [facility]. 1629 scheduled dose of Prilosec and Ativan given and pt took without difficulty with applesauce. PRN dose of Roxicodone 10mg PO given for complaints of back pain by the patient. Pt repositioned for comfort. Safety measures in place. 6824 Scheduled Decadron given per orders and new Fentanyl 25mcg patch applied to right chest. Pt denies needs at this time. Safety measures in place. 56 Pt assisted to bathroom and assisted back to bed and positioned for comfort. Oxygen in place via NC at 2L. Safety measures in place. Door remains open for continued monitoring. 1050 Phone call made to daughter Twila Macias to verify she will be at pt's home when she gets there. Twila Macias will not be there until later today but states pt's  will b home. Phone call made to pt's  but no answer at this time. Msg left to please call this RN at [phone] Phone call to home RN, Frank Amaya to give report but there was no answer. Message left to call [phone] for report. 1158 Pt medicated for comfort measures with Roxanol 10mg PO per pt request for back pain. Also scheduled meds given and pt took without difficulty with applesauce and ginger ale. Safety measures in place. Pt personal belongings are packed and ready for discharge. Safety measures in place. Door remains open for continued monitoring.      1200 Pts called her spouse and he is at home waiting her her arrival.    Reece at the bedside to transport patient home. D/C orders, controlled substance and non controlled sent with Transport, sheet signed.

## 2021-12-07 NOTE — PROGRESS NOTES
184  Received report from Barbara Miranda RN. Pt Id by name and  during rounds.   Pt sitting up in bed. Calm. No s/sx of distress. Denies pain at this time. Flacc =0-1. Resp non labored on 3 L n/c. Lungs diminished. HR reg. BS hypo. Fentanyl 25 mcg/hr patch intact on right arm and dated 21. Scheduled PO medications given. Pt tolerated well. SR up x 2. Bed low/locked. Call light with in reach. Door opened. Pt is under Routine level of care. Will continue to assess need for change of level of care. Collaborate with IDG team regarding discharge planning. Reviewed care plan with CNA.    Pt up to BR and back to bed. C/O back pain 8/10. Oxycodone 10 mg po given with yogurt. Pt repositioned in bed.       Pt sleeping. Calm. Relaxed. No facial grimace. Flacc =0-1. Resp non labored on 3 L n/c. SR up x 2. Bed low/locked. Call light with in reach. Door opened. 2978  Pt arouses easily. Denies pain at this time. Flacc =0-1. Resp non labored on 3 L n/c. Scheduled PO medication given. Pt tolerated well. SR up x 2. Bed low/locked. Call light with in reach. Door opened. 0125  Pt sleeping. No s/sx of distress. Flacc =0-1. Resp non labored on 3 L n/c. SR up x 2. Bed low/locked. Call light with in reach. Door opened. 0323  Pt sleeping. No s/sx of distress. Flacc =0-1. Resp non labored on 3 L n/c. SR up x 2. Bed low/locked. Call light with in reach. Door opened. 0403  Scheduled ativan 1 mg PO given. Pt assisted up to BR and back to bed. SR up x 2. Bed low/locked. Call light with in reach. Door opened. 7412  Pt sleeping. No s/sx of distress. Flacc =0-1. Resp non labored on 3 L n/c. SR up x 2. Bed low/locked. Call light with in reach. Door opened. Report given to Cherelle Mcneil RN.

## 2021-12-07 NOTE — CASE COMMUNICATION
visited with Jaime Hoffman while she was in care at [facility]. See note in Epic dated 12/7/21.

## 2021-12-07 NOTE — PROGRESS NOTES
Today Ms Cathy Falcon is going home.  stopped by to offer well wishes, assurance of prayer and give patient and opportunity to share any concerns she might have with the trip home. She is looking forward to being in here own space and having her family around. Her son is coming in from out of state today. She smiled when she shared that news. She also smiled as she spoke of the grandchildren. She is grateful for her daughter and the care she provides but acknowledges she is responsible for others as well. Mrs. Christian Arredondo asked for some ginger ale and ice water.  prepared those for her and also helped her adjust her bed.  offered a blessing for her journey home to which she responded, \"it will be bumpy\". Her home  will follow up. Referral to Danny Flores RN and Prashant Conway CNA for patient's request for repositioning.

## 2021-12-08 PROBLEM — Z51.5 HOSPICE CARE: Status: ACTIVE | Noted: 2021-01-01

## 2021-12-12 NOTE — HOSPICE
Met with patient, spouse and son. Pt sitting on sofa with walker in place in front of her. VS stable. Pt in agreement with Weekly visits. Made an appointment for Sunday 12/19/2021 at 1600 with Karmen Sexton her son and her . Son will meet with us for next few visits until we establish a routine that patient is comfortable with. Pt up with SBA to bathroom. O2 with long tubing in use. Pt is forgetful at times with short term memory issues. No complaints or needs voiced at time of visit.

## 2021-12-15 NOTE — HOSPICE
Pt presents as aaox3-4 with calm affect. Pt's spouse Candance Long and son Yadiel Sutton are also at visit. Pt denies pain or disocmofrt at this time and reports pain seems to be well controlled at this time. Pt reports she feels well supported by family and states she and Candance Long are able to manage on their own most of the time. Pt is ambulatory with some assistance from Candance Long. Pt reports she is continent. Pt indicates frustration and sadness related to her decline and loss of independence. Active listening and support provided. No other SW needs voiced at this time.

## 2021-12-19 NOTE — HOSPICE
Discussed with Nolan Barnett the increasing weakness and difficulty swallowing. New plan for symptom control to be discussed with Dr Willard Pandya. What to expect in the coming days. Nolan Barnett is agreeable to have Megan Rubio come for visit this week for decline. She will call [place] Monday to make arrangements.     Addendum 12/19,2021 1120 Spoke with Pravin Trujillo NP see new orders will update Nolan Barnett via telephone

## 2021-12-20 NOTE — CASE COMMUNICATION
contacted and spoke with Rojean Felty, pt's son, letting him know that he was aware of some changes in Vida's condition and wanted to visit today. Rojean Felty declined visit for today and stated that they would call when a visit was needed.

## 2021-12-20 NOTE — CASE COMMUNICATION
This HHA spoke with pt's daughter and son who stated they did not want her to have a bath today. Pt's family stated they washed pt up lastnight. Family agreed to skip pt's bath today.

## 2021-12-21 NOTE — HOSPICE
Greeted at door by son who reports patient has not slept all day. Upon entering residence observed patient lying in hospital bed with eyes open. Lethargic throughout visit but cooperative and answering questions appropriately. Assessment completed, see ROS sections. Education provided to daughter Cheko Pearce and some to son Odalys Ohara, see POC. Family denies any other questions, concerns, or supply/medication needs. Declines daily visits, son stating \"I think that is too much\". Instructed family to contact office with any acute changes or needs. Verbalizes understanding and in agreement with POC.

## 2021-12-23 NOTE — HOSPICE
Greeted at door by son. Upon entering residence observed patient sitting upright in high fowlers position in hospital bed. Lethargic and non verbal during this visit. Assessment completed, see ROS sections. Education provided, see POC. Refills for Fentanyl and Roxanol ordered from [facility] on [address] earlier on this date. Denies any further questions, concerns, or needs. Instructed family to contact office with any acute changes or needs. Verbalizes understanding and in agreement with POC.

## 2021-12-25 NOTE — HOSPICE
daily nurse visits due to pt decline and family support. pt is lying in hospital bed  unresponsive. Pt is bedbound. Pt's family denies any pt falls. Instructed pts cg on falls preventions. Pts cg verbalized understanding to teaching. Pts  dtr, norman,  is  present. pts dtr asked about a catheter insertion. Pt is urinating 1-2 times daily. pt appears to be actively dying with poor vital signs. sn offered to place a catheter but dtr refused. Pt has no s&s of pain today. pts dtr is giving pt roxanol prn for s&s of distress. pt appears calm with s&s controlled. Sn reviewed pts meds. Refilled meds: none needed  SN updated mar & reconciled meds. sn updated care plan. Instructed pts cg to call [facility] with any questions or concerns. Pts cg verbalized understanding and agreement. sn will see pt daily for decline.

## 2021-12-25 NOTE — HOSPICE
pts family called and stated that pt was not breathing. sn arrived. pt was absent of vital signs. pt was verified that pt had  at  1915 pm. Estevan Soto was notified of pts death. pts family was present and coping well. sn called [facility]  home. sn called [facility] to have equipment picked up. Sn bathed pt. Family refused the need of a  or  today. all meds were disposed of by sn and patients family using a disopozo bag. Email sent to [facility] staff about pts death. [facility]  was faxed notification of pts death. Instructed family to call [facility] with any questions.

## 2023-01-20 NOTE — PROCEDURE: PATHOLOGY BILLING
Rendering Text In Billing: The biopsy specimen was grossed and processed into a slide. Ketoconazole Pregnancy And Lactation Text: This medication is Pregnancy Category C and it isn't know if it is safe during pregnancy. It is also excreted in breast milk and breast feeding isn't recommended.

## 2023-08-15 NOTE — PROGRESS NOTES
Report received from off-going nurse, Romel Galarza RN, visual identification made, assumed care of pt. Pt getting up to the bathroom, no agitation or restlessness, no grimacing or groaning. Pt respirations unlabored. Tab alert in place, rails up x 2, bed in lowest position, safety maintained. FLACC 0. Assisted pt up to the bathroom with standby assist. Pt states she wants to eat breakfast in bed. Assisted her to bed. Discharge plan is for pt to remain at [facility] under routine level of care until discharge. Will continue to evaluate pt discharge plan for potential changes. Pt on alternating air mattress    0735 set pt up for breakfast and administered Prilosec. Visualized fentanyl patch on right arm     0816 pt states she slept well, discussed the plan to be discharged. 8547 daughter at bedside. 0940 administered the remainder to medications and completed physical assessment. Lung sounds clear, oxygen on at 3 liters per nasal cannula, heart sounds regular, alert and oriented able to make her needs known, abdomen distended, hypoactive bowel sounds, extremities warm with palpable pulses. Pt states her back pain is 7/10 administered oxycodone. 1030 pt states her pain is better. 65 pt daughter arrived with pt's . 1415 pt up to bathroom assisted back up in bed with two assist.    1640 pt up to bathroom, assisted back to bed with two assist. Administered scheduled po medications. Pt states she has back pain 7/10 administered oxycodone    1820 pt up to the bathroom, using toilet and completed mouth care.      31 [phone] 62 assisted pt up to bed, pulled up in bed with two assist    Report given to Alix Hawkins RN per mom pt was playing with ball and friends, went to catch ball and injured R wrist. site intact, full ROM, ice pack provided. CMS intact. awkake alert -PMH -allergies VUTD

## 2023-08-20 NOTE — ADDENDUM NOTE
Addended by: Gris Perrin on: 3/11/2020 12:35 PM     Modules accepted: Orders
[Time Spent: ___ minutes] : I have spent [unfilled] minutes of time on the encounter.

## 2024-04-09 NOTE — HPI: RASH
How Severe Is Your Rash?: mild
Normal stress test, no new recommendations.     Manuelito       Patient updated with results and comments via [employer]. 
Is This A New Presentation, Or A Follow-Up?: Follow Up Rash